# Patient Record
Sex: FEMALE | Race: WHITE
[De-identification: names, ages, dates, MRNs, and addresses within clinical notes are randomized per-mention and may not be internally consistent; named-entity substitution may affect disease eponyms.]

---

## 2017-04-14 ENCOUNTER — HOSPITAL ENCOUNTER (OUTPATIENT)
Dept: HOSPITAL 21 - SAS | Age: 67
Discharge: HOME | End: 2017-04-14
Attending: UROLOGY
Payer: COMMERCIAL

## 2017-04-14 VITALS
HEART RATE: 85 BPM | DIASTOLIC BLOOD PRESSURE: 73 MMHG | RESPIRATION RATE: 12 BRPM | SYSTOLIC BLOOD PRESSURE: 126 MMHG | OXYGEN SATURATION: 100 %

## 2017-04-14 VITALS
RESPIRATION RATE: 15 BRPM | SYSTOLIC BLOOD PRESSURE: 153 MMHG | DIASTOLIC BLOOD PRESSURE: 77 MMHG | HEART RATE: 87 BPM | OXYGEN SATURATION: 100 %

## 2017-04-14 VITALS
OXYGEN SATURATION: 100 % | SYSTOLIC BLOOD PRESSURE: 146 MMHG | RESPIRATION RATE: 11 BRPM | DIASTOLIC BLOOD PRESSURE: 75 MMHG | HEART RATE: 88 BPM

## 2017-04-14 VITALS
RESPIRATION RATE: 18 BRPM | SYSTOLIC BLOOD PRESSURE: 104 MMHG | HEART RATE: 81 BPM | OXYGEN SATURATION: 98 % | DIASTOLIC BLOOD PRESSURE: 52 MMHG

## 2017-04-14 VITALS
DIASTOLIC BLOOD PRESSURE: 85 MMHG | OXYGEN SATURATION: 93 % | HEART RATE: 99 BPM | SYSTOLIC BLOOD PRESSURE: 154 MMHG | RESPIRATION RATE: 18 BRPM

## 2017-04-14 VITALS
DIASTOLIC BLOOD PRESSURE: 90 MMHG | HEART RATE: 88 BPM | SYSTOLIC BLOOD PRESSURE: 162 MMHG | OXYGEN SATURATION: 97 % | RESPIRATION RATE: 18 BRPM

## 2017-04-14 VITALS
OXYGEN SATURATION: 95 % | DIASTOLIC BLOOD PRESSURE: 66 MMHG | RESPIRATION RATE: 16 BRPM | SYSTOLIC BLOOD PRESSURE: 137 MMHG | HEART RATE: 94 BPM

## 2017-04-14 VITALS
SYSTOLIC BLOOD PRESSURE: 136 MMHG | RESPIRATION RATE: 18 BRPM | DIASTOLIC BLOOD PRESSURE: 94 MMHG | OXYGEN SATURATION: 96 % | HEART RATE: 84 BPM

## 2017-04-14 VITALS — BODY MASS INDEX: 29.99 KG/M2 | WEIGHT: 173.5 LBS | HEIGHT: 63.75 IN

## 2017-04-14 VITALS
RESPIRATION RATE: 14 BRPM | HEART RATE: 93 BPM | DIASTOLIC BLOOD PRESSURE: 69 MMHG | SYSTOLIC BLOOD PRESSURE: 120 MMHG | OXYGEN SATURATION: 95 %

## 2017-04-14 VITALS
SYSTOLIC BLOOD PRESSURE: 89 MMHG | OXYGEN SATURATION: 98 % | DIASTOLIC BLOOD PRESSURE: 53 MMHG | RESPIRATION RATE: 12 BRPM | HEART RATE: 83 BPM

## 2017-04-14 DIAGNOSIS — Z87.442: ICD-10-CM

## 2017-04-14 DIAGNOSIS — F17.210: ICD-10-CM

## 2017-04-14 DIAGNOSIS — C67.9: Primary | ICD-10-CM

## 2017-04-14 DIAGNOSIS — J44.9: ICD-10-CM

## 2017-04-14 DIAGNOSIS — Z85.51: ICD-10-CM

## 2017-04-14 DIAGNOSIS — M19.90: ICD-10-CM

## 2017-04-14 DIAGNOSIS — J45.909: ICD-10-CM

## 2017-04-14 DIAGNOSIS — I10: ICD-10-CM

## 2017-04-14 DIAGNOSIS — Z79.82: ICD-10-CM

## 2017-04-14 PROCEDURE — 52234 CYSTOSCOPY AND TREATMENT: CPT

## 2017-04-14 PROCEDURE — 71010: CPT

## 2017-04-14 RX ADMIN — CEFAZOLIN SODIUM ONE: 2 SOLUTION INTRAVENOUS at 08:42

## 2017-04-14 RX ADMIN — CEFAZOLIN SODIUM ONE: 2 SOLUTION INTRAVENOUS at 08:22

## 2017-04-14 NOTE — PCM.ANEP2
Post Anesthesia Evaluation


ASA/CMS Post Anesthesia


VS in Patient's Normal Range?:  Yes


Resp Stable; Airway Patent?:  Yes


CV Function & Hydration Stable:  Yes


Mental Status Recovered?:  Yes


Pain control Satisfactory?:  Yes


N/V Control Satisfactory?:  Yes








Deidre Antunez DO Apr 14, 2017 09:28

## 2017-04-14 NOTE — OP
[address] [address]

 

                                OPERATIVE REPORT

 

PATIENT:  LORRAINE CHA                                    : 1950

ACCOUNT#: [id number]                                            MR#: [id number]

ADMIT:    2017

JOB ID:   [id number]

 

 

DATE OF SURGERY:

2017

 

SURGEON:

Kath Hill MD.

 

ASSISTANT:

None.

 

PREOPERATIVE DIAGNOSIS(ES):

Bladder tumor.

 

POSTOPERATIVE DIAGNOSIS(ES):

Bladder tumor.

 

PROCEDURE PERFORMED:

1.     Pelvic exam under anesthesia.

2.     Cystoscopy and transurethral resection of bladder tumor

(approximately 1.5 cm).

 

FINDINGS:

1.     Papillary tumor posterior and lateral to the ureteral orifice at the 
right

base.

2.     No abnormal masses on pelvic exam.

 

ANESTHESIA:

General.

 

ESTIMATED BLOOD LOSS:

Less than 1 mL.

 

DRAINS:

None.

 

SPECIMEN:

Bladder tumor.

 

COMPLICATIONS:

None.

 

CONDITION:

Stable.

 

INDICATION FOR PROCEDURE:

The patient is a 66-year-old woman with a history of bladder cancer.  She was

found to have recurrent bladder tumor.  She now presents for transurethral

resection of bladder tumor.

 

DESCRIPTION OF PROCEDURE:

After informed consent was obtained, the patient was taken to the operating

room.  A time-out was performed identifying correct patient, surgical site and

procedure.   General anesthesia was smoothly induced.  She was placed in the

lithotomy position and all pressure points were identified and appropriately

padded.  A bimanual pelvic exam was performed; it was nromal.  The bladder was 
fully

mobile.  Next, her genitals were then prepped and draped in usual sterile

fashion.  A 26-Tristanian resectoscope was then applied to the patient's urethra

under direct vision and advanced into the bladder.  The bladder was drained.

Both 30-and 70-degree lenses were used for panendoscopy.  There was just the

one bladder tumor as mentioned.  A 24-Tristanian loop was then applied to the tumor

and it was resected in piecemeal fashion.  There was good resection of the base

with gross muscle visible.  There was no fat ever visible during the procedure.

The specimens were drained from the patient's bladder in their entirety.  The

base of the tumor, as well as a 1 cm surrounding margin, were fulgurated with

the loop.  The bladder was reinspected.  There was excellent hemostasis.  The

tumor was passed off the table as bladder tumor to Pathology.  The instruments

were then removed from patient's body and she was reversed from general

anesthesia and taken to the PACU in good and stable condition.

 

 

SHEILA

## 2017-04-14 NOTE — PCM.HPANE
Patient Data


Surgeon


Admitting Provider:


Attending Provider:Kath Hill MD 


Primary Care Physician:Radha Rhoades PA-C 


Other Provider:[facility]


Reason for Visit


Bladder Tumor


Ht/WT & BMI


Height (Feet):  5


Height (Inches):  3.75


Weight (Kilograms):  79.2


Body Mass Index


30.00





Allergies


Coded Allergies:  


     No Known Allergies (Unverified , 4/12/17)





Past Anesthesia History


Anesthesia History:  Denies:: Anesthesia Reactions, Malignant Hyperthermia





Diabetes History


Hx Diabetes?:  No





MRSA


MRSA:  No





Medications


Blood Thinner:  Aspirin


Hypertension Medication:  Yes (AMLODIPINE,COZAAR)


Reported Medications


Simvastatin 20 Mg Ieakdj63 Mg PO HS  Ref 0


   4/12/17


Omeprazole 20 Mg Tablet.dr20 Mg PO DAILY


   4/12/17


Nortriptyline 25 Mg Chabyap10 Mg PO HS


   4/12/17


Levothyroxine 75 Mcg Fpackp32 Mcg PO DAILY  Ref 0


   4/12/17


Ipratropium Bromide (Ipratropium Bromide Inhalant Solution)0.2 Mg/1 Ml 

Solution0.2 Mg IH QID  Ref 0


   4/12/17


Cyclobenzaprine 10 Mg Vtathz71 Mg PO HS PRN Spasm  Ref 0


   4/12/17


Losartan Potassium (Cozaar)50 Mg Lmjgsd70 Mg PO DAILY


   4/12/17


Albuterol HFA (Proair HFA)8.5 Gm Hfa.aer.ad2 Puffs INHALATION Q4H PRN PRN #1 

INHALER


   4/12/17


Discontinued Reported Medications


Aspirin 81 Mg Kgwbkj23 Mg PO DAILY  Ref 0


   4/12/17


Amlodipine 2.5 Mg Tablet2.5 Mg PO DAILY  Ref 0


   4/12/17





History


History of ENT Problems?:  No


Hx of Heart Problems?:  Yes








Cardiovascular History:   Positive for:: Hypertension (HYPERLIPIDEMIA)





  





 Denies:: Heart Murmur








Hx of Respiratory Problem?:  Yes








Respiratory History:   Positive for:: Asthma





  COPD





  Dyspnea (INTERMITTANT WHEEZING)





  Use of Inhalers / NEBS





  





 Denies:: Use of C-PAP Machine








Hx Neurologic Problems?:  Yes


Hx of GI Problems?:  Yes








Gastrointestinal History:   Positive for:: Gall Bladder Disease (S/P BHASKAR)





  Rectal Bleeding (S/P RECTAL PROLAPSE RPR)








Hx of  Problems?:  Yes








Genitourinary History:   Positive for:: Kidney Stones (HX CYSTINE URINARY TRACT 

STONES)














Other  Pertinent History:   S/P URETEROSCOPY,TURBT X2


 





 


 





 


 





 


 





 HX BLADDER CA 2006,2009


 





 


 





 


 





 


 





 C/OF NOCTURIA,HEMATURIA (RESOLVED)


 





 


 





 


 





 


 





 BLADDER TUMOR=CURRENT PROBLEM














Female Hx:   Denies:: Currently Pregnant














Skin History:   Denies:: History Skin Disorders?





  Pressure Ulcers








Hx Musculoskeletal Problems?:  Yes








Musculoskeletal History:   Positive for:: Musculoskeletal Trauma (S/P THUMB RPR)








Hx of Psycho/Social Problems?:  No


Hx Surgeries?:  Yes (TURBT X2,URETEROSCOPY,THUMB RPR,HYST,BHASKAR,RECTAL PROLAPSE 

RPR)








Other History:   Positive for:: Cancer (BLADDER X2)





  Thyroid Disease





  





 Denies:: Hospitalization








Hx Diabetes:  No


Hx Alcohol Use:  NoHx Substance Use:  NoHave You Smoked inLast 12 mo:  Yes

Approx How Many Cigarettes/day:  6-7 C/DAY





Stop/Bang


S-Snoring: Do You Snore Loudly:  No


T-Tired: feel tired, fatigued:  Yes


O-Obsered: Observed not breath:  No


P-Blood Pressure: treated:  Yes


B- Body Mass Index > 35 kg/m2:  No


A- Age over 50:  Yes


N- Neck Large Circumference:  No


G- Gender Male:  No


OLY Total Score:  3


Risk Assessment Category


Category 1A:


   Patient has history of documented sleep apnea, and HAS NOT received any 

narcotic, sedative or anesthesia administration during this stay.


Category 1B:


   Patient has history of documented sleep apnea, and HAS received any narcotic

, sedative or anesthesia administration during this stay


Category 2:


   Patient has SUSPECTED Obstructive Sleep Apnea, and HAS received any narcotic

, sedative or anesthesia administration during this stay.


Category 3:


   Patient has SUSPECTED Obstructive Sleep Apnea and HAS NOT received narcotic, 

sedative or anesthesia administration during this stay.


Category 4:


   Outpatient in Procedural Areas with known sleep apnea or who screen positive 

for High Risk via the STOP/BANG questionnaire.





Exam


Exam


General Appearance:  Alert, Oriented X3, Cooperative, No Acute Distress


HEENT/AIRWAY:  MP 2


Lungs:  Coarse


Heart:  Regular Rate/Rhythm





Meds/Labs/Diagnostics


Admission Meds





 Current Medications


Lactated Ringer's (Lr) 1,000 ml @  120 mls/hr Q8H20M IV  Last administered on 4/ 14/17at 06:50;  Start 4/14/17 at 05:00;  Stop 4/14/17 at 13:19





Plan


Impression


Patient chart reviewed, patient interviewed and anesthestic plan with risks, 

benefits, and alternatives discussed, and informed consent obtained.


ASA Physical Status:  ASA3 Severe Disease


Anesthetic Plan:  GA


Bene/Risks/Altern/Consents:  Yes


HP Complete Prior to Induction:  Yes








Deidre Antunez DO Apr 14, 2017 07:29

## 2017-04-14 NOTE — DRSVH
PROCEDURE:  X-RAY CHEST ONE VIEW, PORTABLE ([id number])

 

INDICATIONS:  BRONCHOSPASM

 

TECHNIQUE:  One view of the chest was acquired.  

 

COMPARISON:  None.

 

FINDINGS:  

 

Surgical changes and devices: Cervical fixation hardware is grossly intact.

 

Lungs and pleura: Diffuse interstitial markings are present bilaterally, more confluent within the pe
ripheral right lung. No pleural effusion or pneumothorax.

 

Mediastinum:  Mediastinal contours appear normal.  Heart size is normal.  

 

Bones and chest wall:  No suspicious bony lesions.  Overlying soft tissues appear unremarkable.  

 

IMPRESSION:

 

1. Diffuse interstitial markings most confluent in the right peripheral lung. It is unclear whether t
hese represent underlying pulmonary fibrosis or pulmonary edema. No prior comparisons are available t
o determine the acuity of this finding.

 

 

 

Dictated by:  Josseline Aranda M.D. on 4/14/2017 at 10:33     

Approved by:  Josseline Aranda M.D. on 4/14/2017 at 10:36

## 2017-06-21 ENCOUNTER — HOSPITAL ENCOUNTER (OUTPATIENT)
Dept: HOSPITAL 76 - LAB.WCP | Age: 67
Discharge: HOME | End: 2017-06-21
Attending: FAMILY MEDICINE
Payer: COMMERCIAL

## 2017-06-21 DIAGNOSIS — E78.5: Primary | ICD-10-CM

## 2017-06-21 DIAGNOSIS — I10: ICD-10-CM

## 2017-06-21 LAB
ALBUMIN/GLOB SERPL: 1.4 {RATIO} (ref 1–2.2)
ANION GAP SERPL CALCULATED.4IONS-SCNC: 6 MMOL/L (ref 6–13)
BASOPHILS NFR BLD AUTO: 0.1 10^3/UL (ref 0–0.1)
BASOPHILS NFR BLD AUTO: 0.9 %
BILIRUB BLD-MCNC: 0.3 MG/DL (ref 0.2–1)
BUN SERPL-MCNC: 14 MG/DL (ref 6–20)
CALCIUM UR-MCNC: 9.2 MG/DL (ref 8.5–10.3)
CHLORIDE SERPL-SCNC: 106 MMOL/L (ref 101–111)
CHOLEST SERPL-MCNC: 160 MG/DL
CO2 SERPL-SCNC: 28 MMOL/L (ref 21–32)
CREAT SERPLBLD-SCNC: 0.6 MG/DL (ref 0.4–1)
EOSINOPHIL # BLD AUTO: 0.4 10^3/UL (ref 0–0.7)
EOSINOPHIL NFR BLD AUTO: 5.9 %
ERYTHROCYTE [DISTWIDTH] IN BLOOD BY AUTOMATED COUNT: 13.2 % (ref 12–15)
EST. AVERAGE GLUCOSE BLD GHB EST-MCNC: 131 MG/DL (ref 70–100)
GFRSERPLBLD MDRD-ARVRAT: 101 ML/MIN/{1.73_M2} (ref 89–?)
GLOBULIN SER-MCNC: 2.9 G/DL (ref 2.1–4.2)
GLUCOSE SERPL-MCNC: 108 MG/DL (ref 70–100)
HBA1C BLD-MCNC: 0.66 G/DL
HCT VFR BLD AUTO: 40.4 % (ref 37–47)
HDLC SERPL-MCNC: 37 MG/DL
HDLC SERPL: 4.3 {RATIO} (ref ?–4.4)
HGB UR QL STRIP: 13.5 G/DL (ref 12–16)
LDLC/HDLC SERPL: 1.9 {RATIO} (ref ?–4.4)
LYMPHOCYTES # SPEC AUTO: 2.1 10^3/UL (ref 1.5–3.5)
LYMPHOCYTES NFR BLD AUTO: 29.5 %
MCH RBC QN AUTO: 31.9 PG (ref 27–31)
MCHC RBC AUTO-ENTMCNC: 33.4 G/DL (ref 32–36)
MCV RBC AUTO: 95.6 FL (ref 81–99)
MONOCYTES # BLD AUTO: 0.5 10^3/UL (ref 0–1)
MONOCYTES NFR BLD AUTO: 6.5 %
NEUTROPHILS # BLD AUTO: 4 10^3/UL (ref 1.5–6.6)
NEUTROPHILS # SNV AUTO: 7.1 X10^3/UL (ref 4.8–10.8)
NEUTROPHILS NFR BLD AUTO: 57.2 %
NRBC # BLD AUTO: 0.1 /100WBC
PDW BLD AUTO: 7.9 FL (ref 7.9–10.8)
POTASSIUM SERPL-SCNC: 3.9 MMOL/L (ref 3.5–5)
PROT SPEC-MCNC: 6.9 G/DL (ref 6.7–8.2)
RBC MAR: 4.23 10^6/UL (ref 4.2–5.4)
SODIUM SERPLBLD-SCNC: 140 MMOL/L (ref 135–145)
TRIGL P FAST SERPL-MCNC: 254 MG/DL
VLDLC SERPL-SCNC: 51 MG/DL
WBC # BLD: 7.1 X10^3/UL

## 2017-06-21 PROCEDURE — 85025 COMPLETE CBC W/AUTO DIFF WBC: CPT

## 2017-06-21 PROCEDURE — 80061 LIPID PANEL: CPT

## 2017-06-21 PROCEDURE — 36415 COLL VENOUS BLD VENIPUNCTURE: CPT

## 2017-06-21 PROCEDURE — 84443 ASSAY THYROID STIM HORMONE: CPT

## 2017-06-21 PROCEDURE — 83036 HEMOGLOBIN GLYCOSYLATED A1C: CPT

## 2017-06-21 PROCEDURE — 80053 COMPREHEN METABOLIC PANEL: CPT

## 2018-03-16 ENCOUNTER — HOSPITAL ENCOUNTER (INPATIENT)
Dept: HOSPITAL 76 - ED | Age: 68
LOS: 3 days | Discharge: HOME | DRG: 192 | End: 2018-03-19
Attending: FAMILY MEDICINE | Admitting: INTERNAL MEDICINE
Payer: MEDICARE

## 2018-03-16 ENCOUNTER — HOSPITAL ENCOUNTER (OUTPATIENT)
Dept: HOSPITAL 76 - EMS | Age: 68
Discharge: ANOTHER HOSPITAL | End: 2018-03-16
Attending: SURGERY
Payer: COMMERCIAL

## 2018-03-16 DIAGNOSIS — R06.02: Primary | ICD-10-CM

## 2018-03-16 DIAGNOSIS — Z79.82: ICD-10-CM

## 2018-03-16 DIAGNOSIS — J44.9: Primary | ICD-10-CM

## 2018-03-16 DIAGNOSIS — I10: ICD-10-CM

## 2018-03-16 DIAGNOSIS — F17.200: ICD-10-CM

## 2018-03-16 DIAGNOSIS — J84.10: ICD-10-CM

## 2018-03-16 DIAGNOSIS — J44.1: ICD-10-CM

## 2018-03-16 LAB
ALBUMIN DIAFP-MCNC: 4.2 G/DL (ref 3.2–5.5)
ALBUMIN/GLOB SERPL: 1.4 {RATIO} (ref 1–2.2)
ALP SERPL-CCNC: 56 IU/L (ref 42–121)
ALT SERPL W P-5'-P-CCNC: 18 IU/L (ref 10–60)
ANION GAP SERPL CALCULATED.4IONS-SCNC: 9 MMOL/L (ref 6–13)
AST SERPL W P-5'-P-CCNC: 20 IU/L (ref 10–42)
BASOPHILS NFR BLD AUTO: 0.1 10^3/UL (ref 0–0.1)
BASOPHILS NFR BLD AUTO: 0.4 %
BILIRUB BLD-MCNC: 0.4 MG/DL (ref 0.2–1)
BUN SERPL-MCNC: 16 MG/DL (ref 6–20)
CALCIUM UR-MCNC: 8.7 MG/DL (ref 8.5–10.3)
CHLORIDE SERPL-SCNC: 101 MMOL/L (ref 101–111)
CO2 SERPL-SCNC: 25 MMOL/L (ref 21–32)
CREAT SERPLBLD-SCNC: 0.7 MG/DL (ref 0.4–1)
EOSINOPHIL # BLD AUTO: 0 10^3/UL (ref 0–0.7)
EOSINOPHIL NFR BLD AUTO: 0.2 %
ERYTHROCYTE [DISTWIDTH] IN BLOOD BY AUTOMATED COUNT: 13.2 % (ref 12–15)
GFRSERPLBLD MDRD-ARVRAT: 84 ML/MIN/{1.73_M2} (ref 89–?)
GLOBULIN SER-MCNC: 3 G/DL (ref 2.1–4.2)
GLUCOSE SERPL-MCNC: 251 MG/DL (ref 70–100)
HGB UR QL STRIP: 13.1 G/DL (ref 12–16)
LIPASE SERPL-CCNC: 31 U/L (ref 22–51)
LYMPHOCYTES # SPEC AUTO: 4.2 10^3/UL (ref 1.5–3.5)
LYMPHOCYTES NFR BLD AUTO: 21 %
MCH RBC QN AUTO: 29.9 PG (ref 27–31)
MCHC RBC AUTO-ENTMCNC: 32.5 G/DL (ref 32–36)
MCV RBC AUTO: 91.9 FL (ref 81–99)
MONOCYTES # BLD AUTO: 1.3 10^3/UL (ref 0–1)
MONOCYTES NFR BLD AUTO: 6.5 %
NEUTROPHILS # BLD AUTO: 14.4 10^3/UL (ref 1.5–6.6)
NEUTROPHILS # SNV AUTO: 20 X10^3/UL (ref 4.8–10.8)
NEUTROPHILS NFR BLD AUTO: 71.9 %
PDW BLD AUTO: 7.6 FL (ref 7.9–10.8)
PLATELET # BLD: 331 10^3/UL (ref 130–450)
PROT SPEC-MCNC: 7.2 G/DL (ref 6.7–8.2)
RBC MAR: 4.38 10^6/UL (ref 4.2–5.4)
SODIUM SERPLBLD-SCNC: 135 MMOL/L (ref 135–145)

## 2018-03-16 PROCEDURE — 94640 AIRWAY INHALATION TREATMENT: CPT

## 2018-03-16 PROCEDURE — 96365 THER/PROPH/DIAG IV INF INIT: CPT

## 2018-03-16 PROCEDURE — 85610 PROTHROMBIN TIME: CPT

## 2018-03-16 PROCEDURE — 87276 INFLUENZA A AG IF: CPT

## 2018-03-16 PROCEDURE — 82040 ASSAY OF SERUM ALBUMIN: CPT

## 2018-03-16 PROCEDURE — 87275 INFLUENZA B AG IF: CPT

## 2018-03-16 PROCEDURE — 93005 ELECTROCARDIOGRAM TRACING: CPT

## 2018-03-16 PROCEDURE — 36415 COLL VENOUS BLD VENIPUNCTURE: CPT

## 2018-03-16 PROCEDURE — 87150 DNA/RNA AMPLIFIED PROBE: CPT

## 2018-03-16 PROCEDURE — 87040 BLOOD CULTURE FOR BACTERIA: CPT

## 2018-03-16 PROCEDURE — 94660 CPAP INITIATION&MGMT: CPT

## 2018-03-16 PROCEDURE — 71045 X-RAY EXAM CHEST 1 VIEW: CPT

## 2018-03-16 PROCEDURE — 99284 EMERGENCY DEPT VISIT MOD MDM: CPT

## 2018-03-16 PROCEDURE — 80053 COMPREHEN METABOLIC PANEL: CPT

## 2018-03-16 PROCEDURE — 80048 BASIC METABOLIC PNL TOTAL CA: CPT

## 2018-03-16 PROCEDURE — 99285 EMERGENCY DEPT VISIT HI MDM: CPT

## 2018-03-16 PROCEDURE — 83880 ASSAY OF NATRIURETIC PEPTIDE: CPT

## 2018-03-16 PROCEDURE — 93306 TTE W/DOPPLER COMPLETE: CPT

## 2018-03-16 PROCEDURE — 84484 ASSAY OF TROPONIN QUANT: CPT

## 2018-03-16 PROCEDURE — 83690 ASSAY OF LIPASE: CPT

## 2018-03-16 PROCEDURE — 83735 ASSAY OF MAGNESIUM: CPT

## 2018-03-16 PROCEDURE — 85025 COMPLETE CBC W/AUTO DIFF WBC: CPT

## 2018-03-16 PROCEDURE — 84100 ASSAY OF PHOSPHORUS: CPT

## 2018-03-16 NOTE — ED PHYSICIAN DOCUMENTATION
PD HPI DYSPNEA





- Stated complaint


Stated Complaint: SOA





- Chief complaint


Chief Complaint: Resp





- History obtained from


History obtained from: Patient, EMS





- History of Present Illness


Timing - onset: Today


Timing - onset during: Rest


Timing - details: Gradual onset, Still present


Inciting event(s): Exposure (ie smoke)


Improved by: O2, Epi pen


Associated symptoms: Cough, Wheezing.  No: Fever


Similar symptoms before: Work up / diagnostics


Recently seen: Not recently seen





- Additional information


Additional information: 





Patient is a 67 year old female with a history of copd who is presenting to the 

emergency department for shortness of breath.  according to patient and ems 

patient had worsening shortness of breath today, and called ems.  When ems 

arrived patient was very short of breath with accessory muscle use.  Patient 

was treated with a duoneb, solumedrol and epinephrine twice before arriving in 

the emergency department.  Upon arrival patient was still short of breath.  





Review of Systems


Constitutional: denies: Fever, Chills


Eyes: reports: Reviewed and negative


Ears: reports: Reviewed and negative


Nose: reports: Reviewed and negative


Throat: reports: Reviewed and negative


Respiratory: reports: Dyspnea, Cough, Wheezing


GI: denies: Nausea, Vomiting


: reports: Reviewed and negative


Skin: reports: Reviewed and negative


Musculoskeletal: reports: Reviewed and negative


Neurologic: denies: Generalized weakness, Focal weakness, Headache, Head injury


Immunocompromised: denies: Immunocompromised





PD PAST MEDICAL HISTORY





- Past Medical History


Past Medical History: Yes


Respiratory: COPD


Musculoskeletal: Chronic back pain





- Past Surgical History


Past Surgical History: Yes


General: Cholecystectomy


Ortho: Spine surgery


/GYN: Hysterectomy





- Present Medications


Home Medications: 


 Ambulatory Orders











 Medication  Instructions  Recorded  Confirmed


 


ALPRAZolam [Xanax] 0.5 mg PO BID 07/10/13 07/10/13


 


Albuterol Sulfate 2.5 mg IH DAILY 07/10/13 03/17/18


 


Albuterol [Ventolin Hfa] 2 puffs INH Q4H PRN 07/10/13 03/17/18


 


Amlodipine Besylate 2.5 mg PO DAILY 07/10/13 03/17/18


 


Aspirin [Aspir 81] 81 mg PO DAILY 07/10/13 03/17/18


 


Cyclobenzaprine HCl 10 mg PO PRN PRN 07/10/13 03/17/18


 


Ipratropium [Atrovent] 0.2 mg INH DAILY 07/10/13 03/17/18


 


Levothyroxine [Synthroid] 75 mcg PO DAILY 07/10/13 03/17/18


 


Losartan [Cozaar] 50 mg PO DAILY 07/10/13 03/17/18


 


Nortriptyline HCl 25 mg PO DAILY PM 07/10/13 03/17/18


 


Simvastatin [Zocor] 20 mg PO DAILY PM 07/10/13 03/17/18


 


Omeprazole 20 mg PO DAILY 03/17/18 03/17/18


 


busPIRone [Buspar] 15 mg PO BID 03/17/18 03/17/18


 


guaiFENesin [Mucinex] 600 mg PO BID 03/17/18 03/17/18


 


predniSONE [Prednisone]  03/17/18 














- Allergies


Allergies/Adverse Reactions: 


 Allergies











Allergy/AdvReac Type Severity Reaction Status Date / Time


 


No Known Drug Allergies Allergy   Verified 03/16/18 22:20














- Social History


Does the pt smoke?: Yes


Smoking Status: Current every day smoker


Does the pt drink ETOH?: No


Does the pt have substance abuse?: No





- Immunizations


Immunizations are current?: Yes





- POLST


Patient has POLST: No





PD ED PE NORMAL





- Vitals


Vital signs reviewed: Yes





- General


General: Alert and oriented X 3





- HEENT


HEENT: Atraumatic, PERRL





- Neck


Neck: Supple, no meningeal sign, No JVD





- Cardiac


Cardiac: RRR





- Derm


Derm: Normal color, No rash





- Extremities


Extremities: No calf tenderness / cord





- Neuro


Neuro: Alert and oriented X 3, No motor deficit, No sensory deficit, Normal 

speech


Eye Opening: Spontaneous


Motor: Obeys Commands


Verbal: Oriented


GCS Score: 15





- Psych


Psych: Normal mood





PD ED PE EXPANDED





- General


General: Alert, Anxious, In distress





- Cardiac


Cardiac: Tachy





- Respiratory


Respiratory: Distress, Labored, Accessory mm use, Retractions, Wheezing, Right 

upper lobe, Left upper lobe





Results





- Vitals


Vitals: 


 Vital Signs - 24 hr











  03/16/18 03/16/18 03/16/18





  21:53 22:15 22:25


 


Temperature 36.6 C  


 


Heart Rate 130 H 116 H 117 H


 


Respiratory 24  20





Rate   


 


Blood Pressure 165/71 H  


 


O2 Saturation 97  














  03/16/18 03/16/18 03/16/18





  22:28 23:00 23:07


 


Temperature   


 


Heart Rate 116 H 112 H 112 H


 


Respiratory 22 20 21





Rate   


 


Blood Pressure 154/71 H 128/66 


 


O2 Saturation 95 99 99














  03/16/18 03/16/18





  23:16 23:34


 


Temperature  


 


Heart Rate 115 H 115 H


 


Respiratory 21 22





Rate  


 


Blood Pressure  123/68


 


O2 Saturation 99 96








 Oxygen











O2 Source                      BIPAP

















- EKG (time done)


  ** 2239


Rate: Rate (enter#) (114)


Rhythm: Sinus tachycardia


Axis: LAD


QRS: LVH


Other comments: Other comments (rate related ischemia)


Compare to prior EKG: Old EKG unavailable





- Labs


Labs: 


 Laboratory Tests











  03/16/18 03/16/18 03/16/18





  22:50 22:50 22:50


 


WBC  20.0 H  


 


RBC  4.38  


 


Hgb  13.1  


 


Hct  40.2  


 


MCV  91.9  


 


MCH  29.9  


 


MCHC  32.5  


 


RDW  13.2  


 


Plt Count  331  


 


MPV  7.6 L  


 


Neut #  14.4 H  


 


Lymph #  4.2 H  


 


Mono #  1.3 H  


 


Eos #  0.0  


 


Baso #  0.1  


 


Absolute Nucleated RBC  0.00  


 


Nucleated RBC %  0.0  


 


Sodium   135 


 


Potassium   2.9 L 


 


Chloride   101 


 


Carbon Dioxide   25 


 


Anion Gap   9.0 


 


BUN   16 


 


Creatinine   0.7 


 


Estimated GFR (MDRD)   84 L 


 


Glucose   251 H 


 


Calcium   8.7 


 


Total Bilirubin   0.4 


 


AST   20 


 


ALT   18 


 


Alkaline Phosphatase   56 


 


Troponin I    < 0.04


 


B-Natriuretic Peptide   


 


Total Protein   7.2 


 


Albumin   4.2 


 


Globulin   3.0 


 


Albumin/Globulin Ratio   1.4 


 


Lipase   31 














  03/16/18





  22:50


 


WBC 


 


RBC 


 


Hgb 


 


Hct 


 


MCV 


 


MCH 


 


MCHC 


 


RDW 


 


Plt Count 


 


MPV 


 


Neut # 


 


Lymph # 


 


Mono # 


 


Eos # 


 


Baso # 


 


Absolute Nucleated RBC 


 


Nucleated RBC % 


 


Sodium 


 


Potassium 


 


Chloride 


 


Carbon Dioxide 


 


Anion Gap 


 


BUN 


 


Creatinine 


 


Estimated GFR (MDRD) 


 


Glucose 


 


Calcium 


 


Total Bilirubin 


 


AST 


 


ALT 


 


Alkaline Phosphatase 


 


Troponin I 


 


B-Natriuretic Peptide  56


 


Total Protein 


 


Albumin 


 


Globulin 


 


Albumin/Globulin Ratio 


 


Lipase 














- Rads (name of study)


  ** chest x-ray


Radiology: Final report received (chronic findings)





PD MEDICAL DECISION MAKING





- ED course


Complexity details: reviewed old records, reviewed results, re-evaluated patient

, considered differential, d/w patient, d/w consultant


ED course: 





Patient was seen immediately at bedside.  respiratory therapy was contacted and 

bipap was ordered.  Patient was treated with additional duoneb and then started 

on continuous albuterol.  IV access was gained and labs were drawn.  chest x-

ray was ordered.  Patient was started on magnesium.  Patient responded well to 

the bipap.  chest x-ray revealed chronic findings but no acute infiltrate.  

Patient's potassium was replaced.  Hospitalist was contacted and the case was 

discussed with her.  She stated she would order antibiotics and would admit the 

patient to the icu.  Patient was feeling much better on transfer.  





Departure





- Departure


Disposition: [facility] DC/Dai


Clinical Impression: 


 Severe chronic obstructive pulmonary disease





Condition: Critical


Discharge Date/Time: 03/17/18 00:10

## 2018-03-16 NOTE — XRAY PRELIMINARY REPORT
Accession: [id number]

Exam: XR CHEST 1 VIEW X-RAY

 

IMPRESSION: Chronic coarse interstitial opacities, likely mostly related to patient's given history o
f COPD. Difficult to entirely exclude very mild superimposed interstitial pulmonary edema.

 

[facility]

 

SITE ID: [id number]

## 2018-03-16 NOTE — XRAY REPORT
EXAM: 

CHEST RADIOGRAPHY

 

EXAM DATE: 3/16/2018 10:51 PM.

 

CLINICAL HISTORY: Hypoxia, chronic obstructive pulmonary disease.

 

COMPARISON: 11/21/2011, 11/07/2011 CT, 06/30/2011.

 

TECHNIQUE: 1 view.

 

FINDINGS:

Lungs/Pleura: Chronic coarse interstitial opacities without definite focal pneumonia or superimposed 
edema. No gross pneumothorax or large effusion.

 

Mediastinum: Within exam limitations, the cardiomediastinal contour is normal.

 

Other: Previous cervical surgery.

 

IMPRESSION: Chronic coarse interstitial opacities, likely mostly related to patient's given history o
f COPD. Difficult to entirely exclude very mild superimposed interstitial pulmonary edema.

 

[facility]

Referring Provider Line: [phone]

 

SITE ID: [id number]

## 2018-03-17 LAB
ANION GAP SERPL CALCULATED.4IONS-SCNC: 11 MMOL/L (ref 6–13)
BASOPHILS NFR BLD AUTO: 0 10^3/UL (ref 0–0.1)
BASOPHILS NFR BLD AUTO: 0.4 %
BUN SERPL-MCNC: 15 MG/DL (ref 6–20)
CALCIUM UR-MCNC: 8.1 MG/DL (ref 8.5–10.3)
CHLORIDE SERPL-SCNC: 103 MMOL/L (ref 101–111)
CO2 SERPL-SCNC: 21 MMOL/L (ref 21–32)
CREAT SERPLBLD-SCNC: 0.6 MG/DL (ref 0.4–1)
EOSINOPHIL # BLD AUTO: 0 10^3/UL (ref 0–0.7)
EOSINOPHIL NFR BLD AUTO: 0 %
ERYTHROCYTE [DISTWIDTH] IN BLOOD BY AUTOMATED COUNT: 13.3 % (ref 12–15)
GFRSERPLBLD MDRD-ARVRAT: 100 ML/MIN/{1.73_M2} (ref 89–?)
GLUCOSE SERPL-MCNC: 233 MG/DL (ref 70–100)
HGB UR QL STRIP: 12.3 G/DL (ref 12–16)
INFLUENZA A & B AG INTERPRET: (no result)
INR PPP: 1 (ref 0.8–1.2)
LYMPHOCYTES # SPEC AUTO: 0.8 10^3/UL (ref 1.5–3.5)
LYMPHOCYTES NFR BLD AUTO: 6.6 %
MAGNESIUM SERPL-MCNC: 2.1 MG/DL (ref 1.7–2.8)
MCH RBC QN AUTO: 30.2 PG (ref 27–31)
MCHC RBC AUTO-ENTMCNC: 32.3 G/DL (ref 32–36)
MCV RBC AUTO: 93.4 FL (ref 81–99)
MONOCYTES # BLD AUTO: 0.1 10^3/UL (ref 0–1)
MONOCYTES NFR BLD AUTO: 1.1 %
NEUTROPHILS # BLD AUTO: 11 10^3/UL (ref 1.5–6.6)
NEUTROPHILS # SNV AUTO: 12 X10^3/UL (ref 4.8–10.8)
NEUTROPHILS NFR BLD AUTO: 91.9 %
PDW BLD AUTO: 7.7 FL (ref 7.9–10.8)
PHOSPHATE BLD-MCNC: 3.1 MG/DL (ref 2.5–4.6)
PLATELET # BLD: 274 10^3/UL (ref 130–450)
PROTHROM ACT/NOR PPP: 11.4 SECS (ref 9.9–12.6)
RBC MAR: 4.07 10^6/UL (ref 4.2–5.4)
SODIUM SERPLBLD-SCNC: 135 MMOL/L (ref 135–145)

## 2018-03-17 RX ADMIN — METHYLPREDNISOLONE SODIUM SUCCINATE SCH MG: 40 INJECTION, POWDER, FOR SOLUTION INTRAMUSCULAR; INTRAVENOUS at 12:06

## 2018-03-17 RX ADMIN — SODIUM CHLORIDE SCH MLS/HR: 9 INJECTION, SOLUTION INTRAVENOUS at 14:30

## 2018-03-17 RX ADMIN — SODIUM CHLORIDE, PRESERVATIVE FREE SCH ML: 5 INJECTION INTRAVENOUS at 01:28

## 2018-03-17 RX ADMIN — FAMOTIDINE SCH MG: 20 TABLET, FILM COATED ORAL at 08:52

## 2018-03-17 RX ADMIN — NORTRIPTYLINE HYDROCHLORIDE SCH MG: 25 CAPSULE ORAL at 20:35

## 2018-03-17 RX ADMIN — SODIUM CHLORIDE SCH MLS/HR: 9 INJECTION, SOLUTION INTRAVENOUS at 21:23

## 2018-03-17 RX ADMIN — SODIUM CHLORIDE, PRESERVATIVE FREE SCH ML: 5 INJECTION INTRAVENOUS at 08:52

## 2018-03-17 RX ADMIN — ASPIRIN SCH MG: 81 TABLET, COATED ORAL at 08:50

## 2018-03-17 RX ADMIN — DEXTROSE AND SODIUM CHLORIDE SCH MLS/HR: 5; 450 INJECTION, SOLUTION INTRAVENOUS at 21:21

## 2018-03-17 RX ADMIN — SODIUM CHLORIDE, PRESERVATIVE FREE PRN ML: 5 INJECTION INTRAVENOUS at 06:54

## 2018-03-17 RX ADMIN — CYCLOBENZAPRINE SCH MG: 10 TABLET, FILM COATED ORAL at 08:49

## 2018-03-17 RX ADMIN — IPRATROPIUM BROMIDE AND ALBUTEROL SULFATE PRN ML: 2.5; .5 SOLUTION RESPIRATORY (INHALATION) at 09:15

## 2018-03-17 RX ADMIN — METHYLPREDNISOLONE SODIUM SUCCINATE SCH MG: 40 INJECTION, POWDER, FOR SOLUTION INTRAMUSCULAR; INTRAVENOUS at 18:07

## 2018-03-17 RX ADMIN — LOSARTAN POTASSIUM SCH MG: 50 TABLET, FILM COATED ORAL at 08:52

## 2018-03-17 RX ADMIN — IPRATROPIUM BROMIDE AND ALBUTEROL SULFATE PRN ML: 2.5; .5 SOLUTION RESPIRATORY (INHALATION) at 12:15

## 2018-03-17 RX ADMIN — DEXTROSE MONOHYDRATE SCH MLS/HR: 50 INJECTION, SOLUTION INTRAVENOUS at 08:45

## 2018-03-17 RX ADMIN — METHYLPREDNISOLONE SODIUM SUCCINATE SCH MG: 40 INJECTION, POWDER, FOR SOLUTION INTRAMUSCULAR; INTRAVENOUS at 06:54

## 2018-03-17 RX ADMIN — METHYLPREDNISOLONE SODIUM SUCCINATE SCH MG: 40 INJECTION, POWDER, FOR SOLUTION INTRAMUSCULAR; INTRAVENOUS at 01:04

## 2018-03-17 RX ADMIN — SODIUM CHLORIDE, PRESERVATIVE FREE SCH ML: 5 INJECTION INTRAVENOUS at 17:02

## 2018-03-17 RX ADMIN — DEXTROSE MONOHYDRATE SCH MLS/HR: 50 INJECTION, SOLUTION INTRAVENOUS at 01:03

## 2018-03-17 RX ADMIN — SODIUM CHLORIDE, PRESERVATIVE FREE PRN ML: 5 INJECTION INTRAVENOUS at 01:04

## 2018-03-17 RX ADMIN — SODIUM CHLORIDE SCH MLS/HR: 9 INJECTION, SOLUTION INTRAVENOUS at 01:42

## 2018-03-17 RX ADMIN — DEXTROSE AND SODIUM CHLORIDE SCH MLS/HR: 5; 450 INJECTION, SOLUTION INTRAVENOUS at 04:00

## 2018-03-17 NOTE — HISTORY & PHYSICAL EXAMINATION
DATE OF SERVICE: 03/17/2018

Physician: Marlys Biggs MD

 

HISTORY OF PRESENT ILLNESS:  This is a 67-year-old, white female with a history 
of severe COPD,

pulmonary fibrosis, continues to smoke.  She also has a history of 
hypertension.  The patient 

developed sudden shortness of breath, not manageable with her own inhalers, and 
called EMS who 

found her to have severe bronchospasm and she was given nebulizers, Solu-Medrol 
on the scene as

well as epinephrine x2.  She was transferred to the emergency room with severe 
respiratory 

distress, using accessory muscles of respiration and was started on BiPAP 
immediately in the 

ER, along with further nebulizers, magnesium was given and she had improvement 
in her 

respiratory status.  She was so comfortable on BiPAP that she requested to 
leave it on and was 

more comfortable wearing it than taking it off.  Therefore, she is being 
admitted to the ICU.

 

ALLERGIES:  NONE.

 

MEDICATIONS AT HOME

1.  Mucinex.

2.  BuSpar.

3.  Omeprazole.

4.  Cyclobenzaprine.

5.  Cozaar 50 mg daily.

6.  Amlodipine 2.5 mg daily.

7.  Zocor 20 mg daily.

8.  Albuterol.

9.  Atrovent.

10.  Baby aspirin daily.

11.  Synthroid 75 mcg daily.

12.  Nortriptyline 25 mg every evening.

13.  Ventolin HFA.

14.  Prednisone, unknown dose.

15.  Xanax, unknown dose.

 

FAMILY HISTORY:  No inherited diseases.

 

SOCIAL HISTORY:  She continues to smoke unknown amount, uses no alcohol or 
illicit drugs.

 

REVIEW OF SYSTEMS:  A comprehensive review of systems was performed from chart 
review and brief

questions to the patient since she is difficult to interrogate with her BiPAP 
mask on.  The 

pertinent positives are in the HPI.  There apparently has been no fever and she 
produces no 

sputum.

 

PHYSICAL EXAMINATION

GENERAL:  White female who is in mild respiratory distress, wearing a BiPAP.

VITAL SIGNS:  Blood pressure 143/70, heart rate 115, in sinus tachycardia, 
afebrile.  O2 

saturation on BiPAP is 95% at 2 liters.

HEENT:  Shows wearing the BiPAP, I cannot tell if she has moist oral mucosa.

NECK:  Positive JVD at a 30-degree, upright angle.  No carotid bruits.

CHEST:  Diffusely clear with poor air movement.

HEART:  Heart sounds are distant.  No audible murmur.

ABDOMEN:  Soft, obese, mildly distended.  Decreased bowel sounds, nontender.

EXTREMITIES:  Show no clubbing, cyanosis or edema.

NEUROLOGIC:  Difficult to assess because of the BiPAP and respiratory distress.

 

LABORATORIES:  Sodium 135, potassium 2.9, otherwise normal electrolytes, normal 
liver tests.  

Troponin not detectable and BNP normal at 56.  White blood count 20 with a left 
shift, 

hemoglobin 13.1, platelet count normal at 331.  Her influenza A and B are 
negative.  No INR was

done.  No urinalysis was done.

 

Chest x-ray:  Chronic, coarse interstitial opacities and pulmonary edema cannot 
be excluded.

 

IMPRESSION

1.  Acute exacerbation of chronic obstructive pulmonary disease.  She had 
extreme respiratory 

distress, using accessory muscles of respiration and BIPAP is needed.

2.  Pulmonary fibrosis history and continued smoking cigarettes.

3.  Hypertension.

4.  Tobacco use.

 

PLAN:  Place the patient in ICU, and continue with BiPAP and respiratory 
management.  Continue 

with nebulizers, steroids, empiric antibiotics for possible community-acquired 
pneumonia will 

be started:  IV ceftriaxone, IV Zithromax, IV vancomycin.

Obtain sputum if she makes it.  Blood cultures would be helpful too.  Gentle 
hydration.  Obtain

an Echo to evaluate RV function and PA pressure.  Continue with her blood 
pressure and thyroid 

medications and aspirin.

 

DEEP VENOUS THROMBOSIS PROPHYLAXIS:  SCDs.

 

CODE STATUS:  FULL CODE.

 

ATTESTATION:  The patient is expected to be discharged or transferred to 
another facility 

within 96 hours:  Yes.

 

 

DD: 03/17/2018 02:17

TD: 03/17/2018 03:28

Job #: [id number]

MTDD

## 2018-03-17 NOTE — PROVIDER PROGRESS NOTE
Subjective





- Prog Note Date


Prog Note Date: 03/17/18


Prog Note Time: 13:00





- Subjective


Pt reports feeling: Improved (The patient is breathing easier, and is oxygen at 

2 L/min at this time.  She gets very dyspneic with any type of exertion but is 

resting easily on the supplemental oxygen at this time.She denies any fevers, 

chills, pain, or insomnia)





Current Medications





- Current Medications


Current Medications: 


Acetaminophen, alprazolam, amlodipine, aspirin, azithromycin, BuSpar, 

ceftriaxone, cyclobenzaprine, D5W, famotidine, guaifenesin, DuoNeb, Xopenex,

Levothyroxine, losartan, magnesium, methylprednisolone, morphine, nortriptyline

, ondansetron, potassium chloride, sodium chloride, temazepam, vancomycin








Objective





- Vital Signs/Intake & Output


Reviewed Vital Signs: Yes


Vital Signs: 





 Vital Signs











  Temp Pulse Resp BP Pulse Ox


 


 03/17/18 16:00   109 H  23  122/71  97


 


 03/17/18 15:00   113 H  24  138/76 H  95


 


 03/17/18 14:00   115 H  23  121/74  96


 


 03/17/18 13:17  36.9 C  119 H  34 H  104/50 L  98











Intake & Output: 





 Intake & Output











 03/14/18 03/15/18 03/16/18 03/17/18





 23:59 23:59 23:59 23:59


 


Intake Total   50 2285.0


 


Output Total    1750


 


Balance   50 535.0














- Objective


General Appearance: positive: No acute distress, Alert


Eyes Bilateral: positive: Normal inspection, PERRL, EOMI, No lid inflammation, 

Conjunctivae nml, No scleral icterus


ENT: positive: ENT inspection nml, Pharynx nml, No signs of dehydration


Neck: positive: Nml inspection, Thyroid nml, No JVD, Trachea midline.  negative

: Thyromegaly


Respiratory: positive: Chest non-tender, No respiratory distress, Breath sounds 

nml.  negative: Wheezes, Rales, Rhonchi


Cardiovascular: positive: Regular rate & rhythm, No murmur, No gallop


Abdomen: positive: Non-tender, No organomegaly, Nml bowel sounds, No 

distention.  negative: Guarding, Rebound


Back: positive: Nml inspection.  negative: CVA tenderness (R), CVA tenderness (L

)


Skin: positive: Color nml, No rash, Warm, Dry.  negative: Cyanosis


Extremities: positive: Non-tender, Full ROM, Nml appearance, No pedal edema


Neurologic/Psychiatric: positive: Oriented x3, CN's nml (2-12), Motor nml, 

Sensation nml, Mood/affect nml





- Lab Results


Fish Bones: 


 03/17/18 05:05





 03/17/18 05:05


Other Labs: 





 Lab Results x24hrs











  03/17/18 03/17/18 03/17/18 Range/Units





  05:05 05:05 05:05 


 


WBC     (4.8-10.8)  x10^3/uL


 


RBC     (4.20-5.40)  10^6/uL


 


Hgb     (12.0-16.0)  g/dL


 


Hct     (37.0-47.0)  %


 


MCV     (81.0-99.0)  fL


 


MCH     (27.0-31.0)  pg


 


MCHC     (32.0-36.0)  g/dL


 


RDW     (12.0-15.0)  %


 


Plt Count     (130-450)  10^3/uL


 


MPV     (7.9-10.8)  fL


 


Neut #     (1.5-6.6)  10^3/uL


 


Lymph #     (1.5-3.5)  10^3/uL


 


Mono #     (0.0-1.0)  10^3/uL


 


Eos #     (0.0-0.7)  10^3/uL


 


Baso #     (0.0-0.1)  10^3/uL


 


Absolute Nucleated RBC     x10^3/uL


 


Nucleated RBC %     /100WBC


 


PT    11.4  (9.9-12.6)  secs


 


INR    1.0  (0.8-1.2)  


 


Sodium   135   (135-145)  mmol/L


 


Potassium   3.7   (3.5-5.0)  mmol/L


 


Chloride   103   (101-111)  mmol/L


 


Carbon Dioxide   21   (21-32)  mmol/L


 


Anion Gap   11.0   (6-13)  


 


BUN   15   (6-20)  mg/dL


 


Creatinine   0.6   (0.4-1.0)  mg/dL


 


Estimated GFR (MDRD)   100   (>89)  


 


Glucose   233 H   ()  mg/dL


 


Calcium   8.1 L   (8.5-10.3)  mg/dL


 


Phosphorus   3.1   (2.5-4.6)  mg/dL


 


Magnesium   2.1   (1.7-2.8)  mg/dL


 


Troponin I  < 0.04    (<0.49)  ng/mL


 


Influenza A (Rapid)     (Negative)  


 


Influenza B (Rapid)     (Negative)  


 


Influenza Types A,B Ag     














  03/17/18 03/17/18 Range/Units





  05:05 01:10 


 


WBC  12.0 H   (4.8-10.8)  x10^3/uL


 


RBC  4.07 L   (4.20-5.40)  10^6/uL


 


Hgb  12.3   (12.0-16.0)  g/dL


 


Hct  38.0   (37.0-47.0)  %


 


MCV  93.4   (81.0-99.0)  fL


 


MCH  30.2   (27.0-31.0)  pg


 


MCHC  32.3   (32.0-36.0)  g/dL


 


RDW  13.3   (12.0-15.0)  %


 


Plt Count  274   (130-450)  10^3/uL


 


MPV  7.7 L   (7.9-10.8)  fL


 


Neut #  11.0 H   (1.5-6.6)  10^3/uL


 


Lymph #  0.8 L   (1.5-3.5)  10^3/uL


 


Mono #  0.1   (0.0-1.0)  10^3/uL


 


Eos #  0.0   (0.0-0.7)  10^3/uL


 


Baso #  0.0   (0.0-0.1)  10^3/uL


 


Absolute Nucleated RBC  0.00   x10^3/uL


 


Nucleated RBC %  0.0   /100WBC


 


PT    (9.9-12.6)  secs


 


INR    (0.8-1.2)  


 


Sodium    (135-145)  mmol/L


 


Potassium    (3.5-5.0)  mmol/L


 


Chloride    (101-111)  mmol/L


 


Carbon Dioxide    (21-32)  mmol/L


 


Anion Gap    (6-13)  


 


BUN    (6-20)  mg/dL


 


Creatinine    (0.4-1.0)  mg/dL


 


Estimated GFR (MDRD)    (>89)  


 


Glucose    ()  mg/dL


 


Calcium    (8.5-10.3)  mg/dL


 


Phosphorus    (2.5-4.6)  mg/dL


 


Magnesium    (1.7-2.8)  mg/dL


 


Troponin I    (<0.49)  ng/mL


 


Influenza A (Rapid)   Negative  (Negative)  


 


Influenza B (Rapid)   Negative  (Negative)  


 


Influenza Types A,B Ag   -  














- Diagnostic Imaging


Diagnostic Imaging Results: positive: Final report reviewed


Diagnostic Imaging Comments: 


EXAM: 


CHEST RADIOGRAPHY 





EXAM DATE: 3/16/2018 10:51 PM. 





CLINICAL HISTORY: Hypoxia, chronic obstructive pulmonary disease. 





COMPARISON: 11/21/2011, 11/07/2011 CT, 06/30/2011. 





TECHNIQUE: 1 view. 





FINDINGS: 


Lungs/Pleura: Chronic coarse interstitial opacities without definite focal 

pneumonia or 


superimposed edema. No gross pneumothorax or large effusion. 





Mediastinum: Within exam limitations, the cardiomediastinal contour is normal. 





Other: Previous cervical surgery. 





IMPRESSION: Chronic coarse interstitial opacities, likely mostly related to 

patient's given 


history of COPD. Difficult to entirely exclude very mild superimposed 

interstitial pulmonary edema. 














Assessment/Plan





- Problem List


(1) Acute exacerbation of chronic obstructive pulmonary disease (COPD)


Impression: 


The patient has been admitted to the intensive care unit and placed on steroids 

both oral and IV and inhaled, as well as bronchodilators and supplemental 

oxygen.  Her oxygen requirement has dropped down to about 2 L/min and she still 

gets very dyspneic with any type of exertion.  We will continue the present 

care and consider discharging the patient home when she no longer is oxygen 

dependent.She has been counseled multiple times on the importance of smoking 

cessation especially in light of her pulmonary fibrosis however despite this 

she does continue to smoke.








(2) Pulmonary fibrosis


Impression: 


By history, no doubt contributing to this current exacerbation of COPD.  

Unfortunately there is no known effective treatment for pulmonary fibrosis.








(3) Tobacco dependence


Impression: 


The patient has been counseled multiple times on the importance of smoking 

cessation however continues to smoke.  We will attempt once again to educate 

her on this matter.

## 2018-03-18 LAB
ANION GAP SERPL CALCULATED.4IONS-SCNC: 8 MMOL/L (ref 6–13)
BUN SERPL-MCNC: 12 MG/DL (ref 6–20)
CALCIUM UR-MCNC: 8.7 MG/DL (ref 8.5–10.3)
CHLORIDE SERPL-SCNC: 104 MMOL/L (ref 101–111)
CO2 SERPL-SCNC: 26 MMOL/L (ref 21–32)
CREAT SERPLBLD-SCNC: 0.6 MG/DL (ref 0.4–1)
ERYTHROCYTE [DISTWIDTH] IN BLOOD BY AUTOMATED COUNT: 13.3 % (ref 12–15)
GFRSERPLBLD MDRD-ARVRAT: 100 ML/MIN/{1.73_M2} (ref 89–?)
GLUCOSE SERPL-MCNC: 163 MG/DL (ref 70–100)
HGB UR QL STRIP: 12.9 G/DL (ref 12–16)
MCH RBC QN AUTO: 30.8 PG (ref 27–31)
MCHC RBC AUTO-ENTMCNC: 33 G/DL (ref 32–36)
MCV RBC AUTO: 93.5 FL (ref 81–99)
NEUTROPHILS # SNV AUTO: 15.6 X10^3/UL (ref 4.8–10.8)
PDW BLD AUTO: 8.2 FL (ref 7.9–10.8)
PLATELET # BLD: 283 10^3/UL (ref 130–450)
RBC MAR: 4.18 10^6/UL (ref 4.2–5.4)
SODIUM SERPLBLD-SCNC: 138 MMOL/L (ref 135–145)
VANCOMYCIN TROUGH SERPL-MCNC: 6.2 UG/ML (ref 5–15)

## 2018-03-18 RX ADMIN — SODIUM CHLORIDE, PRESERVATIVE FREE SCH ML: 5 INJECTION INTRAVENOUS at 00:33

## 2018-03-18 RX ADMIN — CYCLOBENZAPRINE SCH MG: 10 TABLET, FILM COATED ORAL at 08:55

## 2018-03-18 RX ADMIN — SODIUM CHLORIDE SCH MLS/HR: 9 INJECTION, SOLUTION INTRAVENOUS at 12:55

## 2018-03-18 RX ADMIN — NORTRIPTYLINE HYDROCHLORIDE SCH MG: 25 CAPSULE ORAL at 21:49

## 2018-03-18 RX ADMIN — SODIUM CHLORIDE, PRESERVATIVE FREE SCH ML: 5 INJECTION INTRAVENOUS at 16:58

## 2018-03-18 RX ADMIN — SODIUM CHLORIDE SCH MLS/HR: 9 INJECTION, SOLUTION INTRAVENOUS at 00:33

## 2018-03-18 RX ADMIN — SODIUM CHLORIDE, PRESERVATIVE FREE SCH ML: 5 INJECTION INTRAVENOUS at 08:56

## 2018-03-18 RX ADMIN — LEVOTHYROXINE SODIUM SCH MCG: 0.1 TABLET ORAL at 06:31

## 2018-03-18 RX ADMIN — FAMOTIDINE SCH MG: 20 TABLET, FILM COATED ORAL at 08:55

## 2018-03-18 RX ADMIN — ASPIRIN SCH MG: 81 TABLET, COATED ORAL at 08:54

## 2018-03-18 RX ADMIN — METHYLPREDNISOLONE SODIUM SUCCINATE SCH MG: 40 INJECTION, POWDER, FOR SOLUTION INTRAMUSCULAR; INTRAVENOUS at 18:09

## 2018-03-18 RX ADMIN — LOSARTAN POTASSIUM SCH MG: 50 TABLET, FILM COATED ORAL at 08:55

## 2018-03-18 RX ADMIN — METHYLPREDNISOLONE SODIUM SUCCINATE SCH MG: 40 INJECTION, POWDER, FOR SOLUTION INTRAMUSCULAR; INTRAVENOUS at 06:31

## 2018-03-18 RX ADMIN — IPRATROPIUM BROMIDE AND ALBUTEROL SULFATE PRN ML: 2.5; .5 SOLUTION RESPIRATORY (INHALATION) at 22:25

## 2018-03-18 RX ADMIN — SODIUM CHLORIDE, PRESERVATIVE FREE PRN ML: 5 INJECTION INTRAVENOUS at 06:31

## 2018-03-18 RX ADMIN — DEXTROSE MONOHYDRATE SCH MLS/HR: 50 INJECTION, SOLUTION INTRAVENOUS at 08:20

## 2018-03-18 RX ADMIN — SODIUM CHLORIDE SCH MLS/HR: 9 INJECTION, SOLUTION INTRAVENOUS at 21:49

## 2018-03-18 RX ADMIN — METHYLPREDNISOLONE SODIUM SUCCINATE SCH MG: 40 INJECTION, POWDER, FOR SOLUTION INTRAMUSCULAR; INTRAVENOUS at 00:33

## 2018-03-18 RX ADMIN — METHYLPREDNISOLONE SODIUM SUCCINATE SCH MG: 40 INJECTION, POWDER, FOR SOLUTION INTRAMUSCULAR; INTRAVENOUS at 12:00

## 2018-03-18 RX ADMIN — SODIUM CHLORIDE, PRESERVATIVE FREE PRN ML: 5 INJECTION INTRAVENOUS at 18:09

## 2018-03-18 RX ADMIN — IPRATROPIUM BROMIDE AND ALBUTEROL SULFATE PRN ML: 2.5; .5 SOLUTION RESPIRATORY (INHALATION) at 09:35

## 2018-03-18 NOTE — PROVIDER PROGRESS NOTE
Subjective





- Prog Note Date


Prog Note Date: 03/18/18


Prog Note Time: 14:05





- Subjective


Pt reports feeling: Improved (The patient is breathing easier and was able to 

stay off of supplemental oxygen for about 3 hours this morning.  She still gets 

very dyspneic with any type of exertion, her appetite remains poor to fair, she 

is moving her bowels, and she denies any fevers or chills.)





Current Medications





- Current Medications


Current Medications: 


Acetaminophen, alprazolam, amlodipine, aspirin, azithromycin, BuSpar, 

ceftriaxone, cyclobenzaprine, D5W, famotidine, guaifenesin, DuoNeb, Xopenex,

Levothyroxine, losartan, magnesium, methylprednisolone, morphine, nortriptyline

, ondansetron, potassium chloride, sodium chloride, temazepam, vancomycin











Objective





- Vital Signs/Intake & Output


Reviewed Vital Signs: Yes


Vital Signs: 





 Vital Signs











  Temp Pulse Resp BP Pulse Ox


 


 03/18/18 13:00  36.8 C  118 H  21  128/89 H  92











Intake & Output: 





 Intake & Output











 03/15/18 03/16/18 03/17/18 03/18/18





 23:59 23:59 23:59 23:59


 


Intake Total  50 3978.000 1275


 


Output Total   3150 2450


 


Balance  50 828.000 -1175














- Objective


General Appearance: positive: No acute distress, Alert


Eyes Bilateral: positive: Normal inspection, PERRL, EOMI, No lid inflammation, 

Conjunctivae nml, No scleral icterus


ENT: positive: ENT inspection nml, Pharynx nml, No signs of dehydration


Neck: positive: Nml inspection, Thyroid nml, No JVD, Trachea midline.  negative

: Thyromegaly


Respiratory: positive: Chest non-tender, No respiratory distress, Breath sounds 

nml.  negative: Wheezes, Rales, Rhonchi


Cardiovascular: positive: Regular rate & rhythm, No murmur, No gallop


Abdomen: positive: Non-tender, No organomegaly, Nml bowel sounds, No 

distention.  negative: Guarding, Rebound


Back: positive: Nml inspection.  negative: CVA tenderness (R), CVA tenderness (L

)


Skin: positive: Color nml, No rash, Warm, Dry.  negative: Cyanosis


Extremities: positive: Non-tender, Full ROM, Nml appearance, No pedal edema


Neurologic/Psychiatric: positive: Oriented x3, CN's nml (2-12), Motor nml, 

Sensation nml, Mood/affect nml





- Lab Results


Fish Bones: 


 03/18/18 05:00





 03/18/18 05:00


Other Labs: 





 Lab Results x24hrs











  03/18/18 03/18/18 03/18/18 Range/Units





  12:26 05:00 05:00 


 


WBC    15.6 H  (4.8-10.8)  x10^3/uL


 


RBC    4.18 L  (4.20-5.40)  10^6/uL


 


Hgb    12.9  (12.0-16.0)  g/dL


 


Hct    39.1  (37.0-47.0)  %


 


MCV    93.5  (81.0-99.0)  fL


 


MCH    30.8  (27.0-31.0)  pg


 


MCHC    33.0  (32.0-36.0)  g/dL


 


RDW    13.3  (12.0-15.0)  %


 


Plt Count    283  (130-450)  10^3/uL


 


MPV    8.2  (7.9-10.8)  fL


 


Sodium   138   (135-145)  mmol/L


 


Potassium   4.1   (3.5-5.0)  mmol/L


 


Chloride   104   (101-111)  mmol/L


 


Carbon Dioxide   26   (21-32)  mmol/L


 


Anion Gap   8.0   (6-13)  


 


BUN   12   (6-20)  mg/dL


 


Creatinine   0.6   (0.4-1.0)  mg/dL


 


Estimated GFR (MDRD)   100   (>89)  


 


Glucose   163 H   ()  mg/dL


 


Calcium   8.7   (8.5-10.3)  mg/dL


 


Phosphorus     (2.5-4.6)  mg/dL


 


Last Dose Date  3/18/18    


 


Last Dose Time  0203    


 


Vancomycin Trough  6.2    (5.0-15.0)  ug/mL














  03/18/18 Range/Units





  04:59 


 


WBC   (4.8-10.8)  x10^3/uL


 


RBC   (4.20-5.40)  10^6/uL


 


Hgb   (12.0-16.0)  g/dL


 


Hct   (37.0-47.0)  %


 


MCV   (81.0-99.0)  fL


 


MCH   (27.0-31.0)  pg


 


MCHC   (32.0-36.0)  g/dL


 


RDW   (12.0-15.0)  %


 


Plt Count   (130-450)  10^3/uL


 


MPV   (7.9-10.8)  fL


 


Sodium   (135-145)  mmol/L


 


Potassium   (3.5-5.0)  mmol/L


 


Chloride   (101-111)  mmol/L


 


Carbon Dioxide   (21-32)  mmol/L


 


Anion Gap   (6-13)  


 


BUN   (6-20)  mg/dL


 


Creatinine   (0.4-1.0)  mg/dL


 


Estimated GFR (MDRD)   (>89)  


 


Glucose   ()  mg/dL


 


Calcium   (8.5-10.3)  mg/dL


 


Phosphorus  3.0  (2.5-4.6)  mg/dL


 


Last Dose Date   


 


Last Dose Time   


 


Vancomycin Trough   (5.0-15.0)  ug/mL














Assessment/Plan





- Problem List


(1) Acute exacerbation of chronic obstructive pulmonary disease (COPD)


Impression: 


The patient has been admitted to the intensive care unit and placed on steroids 

both oral and IV and inhaled, as well as bronchodilators and supplemental 

oxygen.  Her oxygen requirement has dropped down to about 2 L/min (with up to 3 

hours off of supplemental oxygen completely) and she still gets very dyspneic 

with any type of exertion.  We will continue the present care and We will 

transfer the patient back to the medical surgical floor as she no longer is 

oxygen dependent. She has been counseled multiple times on the importance of 

smoking cessation especially in light of her pulmonary fibrosis however despite 

this she does continue to smoke.











(2) Pulmonary fibrosis


Impression: 


By history, no doubt contributing to this current exacerbation of COPD.  

Unfortunately there is no known effective treatment for pulmonary fibrosis.











(3) Tobacco dependence


Impression: 


The patient has been counseled multiple times on the importance of smoking 

cessation however continues to smoke.  We will attempt once again to educate 

her on this matter. The patient says she is going to quit smoking again.

## 2018-03-19 VITALS — DIASTOLIC BLOOD PRESSURE: 103 MMHG | SYSTOLIC BLOOD PRESSURE: 154 MMHG

## 2018-03-19 LAB
ANION GAP SERPL CALCULATED.4IONS-SCNC: 9 MMOL/L (ref 6–13)
BUN SERPL-MCNC: 16 MG/DL (ref 6–20)
CALCIUM UR-MCNC: 8.7 MG/DL (ref 8.5–10.3)
CHLORIDE SERPL-SCNC: 103 MMOL/L (ref 101–111)
CO2 SERPL-SCNC: 26 MMOL/L (ref 21–32)
CREAT SERPLBLD-SCNC: 0.5 MG/DL (ref 0.4–1)
ERYTHROCYTE [DISTWIDTH] IN BLOOD BY AUTOMATED COUNT: 13.3 % (ref 12–15)
GFRSERPLBLD MDRD-ARVRAT: 124 ML/MIN/{1.73_M2} (ref 89–?)
GLUCOSE SERPL-MCNC: 169 MG/DL (ref 70–100)
HGB UR QL STRIP: 12.6 G/DL (ref 12–16)
MCH RBC QN AUTO: 29.9 PG (ref 27–31)
MCHC RBC AUTO-ENTMCNC: 32.1 G/DL (ref 32–36)
MCV RBC AUTO: 93.2 FL (ref 81–99)
NEUTROPHILS # SNV AUTO: 13.8 X10^3/UL (ref 4.8–10.8)
PDW BLD AUTO: 7.8 FL (ref 7.9–10.8)
PHOSPHATE BLD-MCNC: 3.4 MG/DL (ref 2.5–4.6)
PLATELET # BLD: 308 10^3/UL (ref 130–450)
RBC MAR: 4.23 10^6/UL (ref 4.2–5.4)
SODIUM SERPLBLD-SCNC: 138 MMOL/L (ref 135–145)
VANCOMYCIN TROUGH SERPL-MCNC: 4.9 UG/ML (ref 5–15)

## 2018-03-19 RX ADMIN — LEVOTHYROXINE SODIUM SCH MCG: 0.1 TABLET ORAL at 06:10

## 2018-03-19 RX ADMIN — DEXTROSE MONOHYDRATE SCH MLS/HR: 50 INJECTION, SOLUTION INTRAVENOUS at 09:04

## 2018-03-19 RX ADMIN — CYCLOBENZAPRINE SCH MG: 10 TABLET, FILM COATED ORAL at 09:04

## 2018-03-19 RX ADMIN — METHYLPREDNISOLONE SODIUM SUCCINATE SCH MG: 40 INJECTION, POWDER, FOR SOLUTION INTRAMUSCULAR; INTRAVENOUS at 00:39

## 2018-03-19 RX ADMIN — FAMOTIDINE SCH MG: 20 TABLET, FILM COATED ORAL at 09:04

## 2018-03-19 RX ADMIN — SODIUM CHLORIDE SCH MLS/HR: 9 INJECTION, SOLUTION INTRAVENOUS at 00:39

## 2018-03-19 RX ADMIN — ASPIRIN SCH MG: 81 TABLET, COATED ORAL at 09:04

## 2018-03-19 RX ADMIN — LOSARTAN POTASSIUM SCH MG: 50 TABLET, FILM COATED ORAL at 09:04

## 2018-03-19 RX ADMIN — SODIUM CHLORIDE, PRESERVATIVE FREE SCH ML: 5 INJECTION INTRAVENOUS at 09:03

## 2018-03-19 RX ADMIN — SODIUM CHLORIDE, PRESERVATIVE FREE SCH ML: 5 INJECTION INTRAVENOUS at 00:39

## 2018-03-19 RX ADMIN — METHYLPREDNISOLONE SODIUM SUCCINATE SCH MG: 40 INJECTION, POWDER, FOR SOLUTION INTRAMUSCULAR; INTRAVENOUS at 06:10

## 2018-03-19 NOTE — DISCHARGE PLAN
Discharge Plan


Disposition: 01 Home, Self Care


Condition: Stable


Prescriptions: 


Ipratropium/Albuterol [Duoneb] 3 ml INH Q4HR PRN #12 amp


 PRN Reason: Wheezing


amLODIPine [Norvasc] 5 mg PO DAILY #20 tablet


Budesonide/Formoterol Fumarate [Symbicort 160-4.5 Mcg Inhaler] 10.2 gm IH DAILY 

#1 hfa.aer.ad


Diet: Regular


Activity Restrictions: Activity as Tolerated


Shower Restrictions: No


Driving Restrictions: No


Weight Bearing: Full Weight


No Smoking: If you smoke, Please STOP!  Call [phone] for help.


Follow-up with: 


Bianca Perdomo PA-C [Primary Care Provider] -

## 2018-03-19 NOTE — DISCHARGE SUMMARY
Discharge Summary


Admit Date: 03/17/18


Discharge Date: 03/19/18


Discharging Provider: Gem Johnson DO


Primary Care Provider: Bianca Perdomo


Code Status: Attempt Resuscitation


Condition at Discharge: Stable


Discharge Disposition: 01 Home, Self Care





- DIAGNOSES


Admission Diagnoses: 


1.  Acute exacerbation of COPD 


2.  Pulmonary fibrosis history 


3.  Continued tobaccoism/tobacco use 


4.  Hypertension





Discharge Diagnoses with Status of Each Condition: 


1.  Acute exacerbation of COPD- Resolving/resolved.  The patient no longer 

requires supplemental oxygen.  He is ambulating with some shortness of breath 

but still able to perform her ADLs. Continue budesonide, Follow-up with your 

primary care provider, pulmonologist in the next week.


2.  Pulmonary fibrosis history 


3.  Continued tobaccoism/tobacco use -You must stop smoking.


4.  Hypertension- Resume your home medications.








- HPI


History of Present Illness: 


From Dr Biggs's H&P:





Patient is a 67-year-old white female with a history of severe COPD, pulmonary 

fibrosis, and tobaccoism who continues to smoke.  She also has a history of 

hypertension.  The patient developed sudden shortness of breath which was not 

manageable with her inhalers and called EMS who found her to have severe 

bronchospasms.  She was given nebulizers, Solu-Medrol and epinephrine twice on 

scene.  She was brought into the emergency department with severe respiratory 

distress and was found to be using accessory muscles of respiration.  She was 

started on BiPAP immediately in emergency department along with further 

nebulizers and magnesium.  Patient's respiratory status improved and she was so 

comfortable on BiPAP that she requested that it be left on.  She was admitted 

to the intensive care unit





- HOSPITAL COURSE


Hospital Course: 





The patient was admitted to the intensive care unit and placed on steroids and 

bronchodilators.  Her respiratory status began to slowly improve and she was 

able to be brought off of supplemental oxygen and transferred to the medical 

surgical floor.  She did well ambulating on the jaramillo without oxygen and was 

therefore deemed ready to be discharged.





- ALLERGIES


Allergies/Adverse Reactions: 


 Allergies











Allergy/AdvReac Type Severity Reaction Status Date / Time


 


No Known Drug Allergies Allergy   Verified 03/16/18 22:20














- MEDICATIONS


Home Medications: 


 Ambulatory Orders











 Medication  Instructions  Recorded  Confirmed


 


ALPRAZolam [Xanax] 0.25 - 0.5 mg PO DAILY PRN 07/10/13 03/17/18


 


Amlodipine Besylate 5 mg PO DAILY 07/10/13 03/17/18


 


Aspirin [Aspir 81] 81 mg PO DAILY 07/10/13 03/17/18


 


Cyclobenzaprine HCl 10 mg PO TID PRN 07/10/13 03/17/18


 


Levothyroxine [Synthroid] 100 mcg PO QDAC 07/10/13 03/17/18


 


Losartan [Cozaar] 100 mg PO DAILY 07/10/13 03/17/18


 


Nortriptyline HCl 25 mg PO QPM 07/10/13 03/17/18


 


Simvastatin [Zocor] 20 mg PO DAILY PM 07/10/13 03/17/18


 


Acetaminophen 500 mg PO BID PRN 03/17/18 03/17/18


 


Albuterol 3 ml INH Q4H PRN 03/17/18 03/17/18


 


Albuterol Sulfate [Proair Hfa 2 puffs INH Q4H PRN 03/17/18 03/17/18





Inhaler]   


 


Ipratropium [Atrovent] 2.5 ml INH Q4H PRN 03/17/18 03/17/18


 


Omeprazole 20 mg PO QDAC 03/17/18 03/17/18


 


busPIRone [Buspar] 30 mg PO BID 03/17/18 03/17/18


 


guaiFENesin [Mucinex] 600 mg PO DAILY 03/17/18 03/17/18


 


Budesonide/Formoterol Fumarate 10.2 gm IH DAILY #1 hfa.aer.ad 03/19/18 





[Symbicort 160-4.5 Mcg Inhaler]   


 


Ipratropium/Albuterol [Duoneb] 3 ml INH Q4HR PRN #12 amp 03/19/18 


 


amLODIPine [Norvasc] 5 mg PO DAILY #20 tablet 03/19/18 


 


guaiFENesin [Mucinex] 600 mg PO BID  tablet 03/19/18 


 


guaiFENesin/CODEINE [Robitussin AC] 5 ml PO Q6H PRN  udc 03/19/18 














- PHYSICAL EXAM AT DISCHARGE


General Appearance: positive: No acute distress, Alert, Mild distress


Eyes Bilateral: positive: Normal inspection, PERRL, EOMI, No lid inflammation, 

Conjunctivae nml, No scleral icterus


ENT: positive: ENT inspection nml, Pharynx nml, No signs of dehydration


Neck: positive: Nml inspection, Thyroid nml, No JVD, Trachea midline.  negative

: Thyromegaly


Respiratory: positive: Chest non-tender, No respiratory distress, Breath sounds 

nml.  negative: Wheezes, Rales, Rhonchi


Cardiovascular: positive: Regular rate & rhythm, No murmur, No gallop


Peripheral Pulses: positive: 1+


Abdomen: positive: Non-tender, No organomegaly, Nml bowel sounds, No 

distention.  negative: Guarding, Rebound


Back: positive: Nml inspection.  negative: CVA tenderness (R), CVA tenderness (L

)


Skin: positive: Color nml, No rash, Warm, Dry.  negative: Cyanosis


Extremities: positive: Non-tender, Full ROM, Nml appearance, No pedal edema


Neurologic/Psychiatric: positive: Oriented x3, CN's nml (2-12), Motor nml, 

Sensation nml, Mood/affect nml





- LABS


Result Diagrams: 


 03/19/18 04:57





 03/19/18 04:57





- FOLLOW UP


Follow Up: 





Follow-up with Bianca Perdomo next week.





- TIME SPENT


Time Spent in Discharge (Minutes): 40

## 2023-05-19 ENCOUNTER — HOSPITAL ENCOUNTER (OUTPATIENT)
Dept: HOSPITAL 76 - EMS | Age: 73
End: 2023-05-19
Payer: COMMERCIAL

## 2023-05-19 DIAGNOSIS — R06.00: Primary | ICD-10-CM

## 2023-08-30 ENCOUNTER — HOSPITAL ENCOUNTER (OUTPATIENT)
Dept: HOSPITAL 76 - EMS | Age: 73
End: 2023-08-30
Payer: MEDICARE

## 2023-08-30 DIAGNOSIS — R06.02: Primary | ICD-10-CM

## 2023-08-31 ENCOUNTER — HOSPITAL ENCOUNTER (INPATIENT)
Dept: HOSPITAL 73 - ED | Age: 73
LOS: 1 days | Discharge: HOME | DRG: 177 | End: 2023-09-01
Payer: MEDICARE

## 2023-08-31 ENCOUNTER — HOSPITAL ENCOUNTER (OUTPATIENT)
Dept: HOSPITAL 76 - EMS | Age: 73
Discharge: ANOTHER HOSPITAL | End: 2023-08-31
Payer: MEDICARE

## 2023-08-31 VITALS — BODY MASS INDEX: 23 KG/M2

## 2023-08-31 VITALS — SYSTOLIC BLOOD PRESSURE: 161 MMHG | OXYGEN SATURATION: 92 % | DIASTOLIC BLOOD PRESSURE: 77 MMHG | HEART RATE: 99 BPM

## 2023-08-31 VITALS
DIASTOLIC BLOOD PRESSURE: 59 MMHG | HEART RATE: 82 BPM | SYSTOLIC BLOOD PRESSURE: 125 MMHG | RESPIRATION RATE: 16 BRPM | OXYGEN SATURATION: 92 %

## 2023-08-31 VITALS
OXYGEN SATURATION: 100 % | HEART RATE: 88 BPM | RESPIRATION RATE: 20 BRPM | SYSTOLIC BLOOD PRESSURE: 126 MMHG | DIASTOLIC BLOOD PRESSURE: 63 MMHG

## 2023-08-31 VITALS — DIASTOLIC BLOOD PRESSURE: 60 MMHG | OXYGEN SATURATION: 96 % | SYSTOLIC BLOOD PRESSURE: 114 MMHG | HEART RATE: 97 BPM

## 2023-08-31 VITALS — HEART RATE: 102 BPM | OXYGEN SATURATION: 94 %

## 2023-08-31 VITALS — OXYGEN SATURATION: 85 % | HEART RATE: 101 BPM | RESPIRATION RATE: 46 BRPM

## 2023-08-31 VITALS — OXYGEN SATURATION: 93 % | SYSTOLIC BLOOD PRESSURE: 148 MMHG | DIASTOLIC BLOOD PRESSURE: 73 MMHG | HEART RATE: 101 BPM

## 2023-08-31 VITALS
OXYGEN SATURATION: 91 % | SYSTOLIC BLOOD PRESSURE: 136 MMHG | HEART RATE: 98 BPM | RESPIRATION RATE: 23 BRPM | DIASTOLIC BLOOD PRESSURE: 63 MMHG

## 2023-08-31 VITALS — OXYGEN SATURATION: 95 % | HEART RATE: 85 BPM | RESPIRATION RATE: 20 BRPM

## 2023-08-31 VITALS — RESPIRATION RATE: 22 BRPM | OXYGEN SATURATION: 96 % | TEMPERATURE: 97.88 F | HEART RATE: 87 BPM

## 2023-08-31 VITALS — OXYGEN SATURATION: 96 % | RESPIRATION RATE: 20 BRPM | HEART RATE: 101 BPM

## 2023-08-31 VITALS
OXYGEN SATURATION: 91 % | DIASTOLIC BLOOD PRESSURE: 109 MMHG | SYSTOLIC BLOOD PRESSURE: 145 MMHG | RESPIRATION RATE: 27 BRPM | HEART RATE: 103 BPM

## 2023-08-31 VITALS
TEMPERATURE: 97.88 F | SYSTOLIC BLOOD PRESSURE: 138 MMHG | HEART RATE: 71 BPM | OXYGEN SATURATION: 96 % | DIASTOLIC BLOOD PRESSURE: 59 MMHG | RESPIRATION RATE: 29 BRPM

## 2023-08-31 VITALS
DIASTOLIC BLOOD PRESSURE: 94 MMHG | OXYGEN SATURATION: 94 % | SYSTOLIC BLOOD PRESSURE: 135 MMHG | HEART RATE: 103 BPM | RESPIRATION RATE: 26 BRPM

## 2023-08-31 VITALS
SYSTOLIC BLOOD PRESSURE: 125 MMHG | HEART RATE: 80 BPM | TEMPERATURE: 97 F | RESPIRATION RATE: 20 BRPM | DIASTOLIC BLOOD PRESSURE: 59 MMHG | OXYGEN SATURATION: 93 %

## 2023-08-31 VITALS
RESPIRATION RATE: 35 BRPM | RESPIRATION RATE: 35 BRPM | HEART RATE: 100 BPM | OXYGEN SATURATION: 94 % | DIASTOLIC BLOOD PRESSURE: 98 MMHG | RESPIRATION RATE: 35 BRPM | HEART RATE: 100 BPM | OXYGEN SATURATION: 94 % | HEART RATE: 100 BPM | OXYGEN SATURATION: 94 % | HEART RATE: 100 BPM | RESPIRATION RATE: 35 BRPM | HEART RATE: 100 BPM | OXYGEN SATURATION: 94 % | RESPIRATION RATE: 35 BRPM | HEART RATE: 100 BPM | RESPIRATION RATE: 35 BRPM | OXYGEN SATURATION: 94 % | OXYGEN SATURATION: 94 % | SYSTOLIC BLOOD PRESSURE: 170 MMHG | OXYGEN SATURATION: 94 % | HEART RATE: 100 BPM | RESPIRATION RATE: 15 BRPM

## 2023-08-31 VITALS
DIASTOLIC BLOOD PRESSURE: 96 MMHG | SYSTOLIC BLOOD PRESSURE: 170 MMHG | RESPIRATION RATE: 38 BRPM | OXYGEN SATURATION: 87 % | HEART RATE: 101 BPM

## 2023-08-31 VITALS — HEART RATE: 109 BPM | OXYGEN SATURATION: 86 %

## 2023-08-31 VITALS — HEART RATE: 92 BPM | OXYGEN SATURATION: 96 %

## 2023-08-31 VITALS
HEART RATE: 75 BPM | OXYGEN SATURATION: 94 % | RESPIRATION RATE: 25 BRPM | SYSTOLIC BLOOD PRESSURE: 138 MMHG | DIASTOLIC BLOOD PRESSURE: 60 MMHG

## 2023-08-31 VITALS
DIASTOLIC BLOOD PRESSURE: 66 MMHG | OXYGEN SATURATION: 96 % | TEMPERATURE: 97.8 F | SYSTOLIC BLOOD PRESSURE: 133 MMHG | RESPIRATION RATE: 23 BRPM | HEART RATE: 102 BPM

## 2023-08-31 VITALS — SYSTOLIC BLOOD PRESSURE: 143 MMHG | HEART RATE: 98 BPM | DIASTOLIC BLOOD PRESSURE: 66 MMHG | OXYGEN SATURATION: 96 %

## 2023-08-31 VITALS — OXYGEN SATURATION: 97 % | HEART RATE: 87 BPM

## 2023-08-31 VITALS — HEART RATE: 101 BPM | RESPIRATION RATE: 26 BRPM | OXYGEN SATURATION: 94 %

## 2023-08-31 VITALS — HEART RATE: 94 BPM | OXYGEN SATURATION: 94 % | RESPIRATION RATE: 24 BRPM

## 2023-08-31 VITALS
SYSTOLIC BLOOD PRESSURE: 139 MMHG | OXYGEN SATURATION: 92 % | DIASTOLIC BLOOD PRESSURE: 73 MMHG | RESPIRATION RATE: 24 BRPM | HEART RATE: 91 BPM

## 2023-08-31 VITALS
SYSTOLIC BLOOD PRESSURE: 114 MMHG | DIASTOLIC BLOOD PRESSURE: 58 MMHG | HEART RATE: 93 BPM | OXYGEN SATURATION: 93 % | RESPIRATION RATE: 26 BRPM

## 2023-08-31 VITALS — HEART RATE: 93 BPM | OXYGEN SATURATION: 97 %

## 2023-08-31 VITALS — HEART RATE: 80 BPM | RESPIRATION RATE: 24 BRPM | OXYGEN SATURATION: 93 %

## 2023-08-31 VITALS — OXYGEN SATURATION: 97 % | HEART RATE: 98 BPM

## 2023-08-31 VITALS — OXYGEN SATURATION: 92 % | HEART RATE: 91 BPM

## 2023-08-31 VITALS — HEART RATE: 136 BPM | OXYGEN SATURATION: 92 %

## 2023-08-31 VITALS
DIASTOLIC BLOOD PRESSURE: 60 MMHG | HEART RATE: 103 BPM | TEMPERATURE: 97.88 F | RESPIRATION RATE: 22 BRPM | OXYGEN SATURATION: 97 % | SYSTOLIC BLOOD PRESSURE: 114 MMHG

## 2023-08-31 VITALS — HEART RATE: 86 BPM | OXYGEN SATURATION: 97 %

## 2023-08-31 VITALS — OXYGEN SATURATION: 92 % | HEART RATE: 102 BPM

## 2023-08-31 VITALS — HEART RATE: 84 BPM | OXYGEN SATURATION: 94 % | RESPIRATION RATE: 22 BRPM

## 2023-08-31 VITALS — OXYGEN SATURATION: 95 % | HEART RATE: 99 BPM

## 2023-08-31 VITALS — HEART RATE: 103 BPM | SYSTOLIC BLOOD PRESSURE: 144 MMHG | OXYGEN SATURATION: 95 % | DIASTOLIC BLOOD PRESSURE: 69 MMHG

## 2023-08-31 VITALS — OXYGEN SATURATION: 91 % | HEART RATE: 101 BPM | RESPIRATION RATE: 48 BRPM

## 2023-08-31 VITALS
SYSTOLIC BLOOD PRESSURE: 145 MMHG | HEART RATE: 101 BPM | DIASTOLIC BLOOD PRESSURE: 65 MMHG | RESPIRATION RATE: 25 BRPM | OXYGEN SATURATION: 91 %

## 2023-08-31 VITALS — DIASTOLIC BLOOD PRESSURE: 64 MMHG | HEART RATE: 94 BPM | SYSTOLIC BLOOD PRESSURE: 142 MMHG | OXYGEN SATURATION: 93 %

## 2023-08-31 VITALS
RESPIRATION RATE: 28 BRPM | HEART RATE: 94 BPM | OXYGEN SATURATION: 90 % | TEMPERATURE: 98.5 F | SYSTOLIC BLOOD PRESSURE: 126 MMHG | DIASTOLIC BLOOD PRESSURE: 65 MMHG

## 2023-08-31 VITALS
OXYGEN SATURATION: 94 % | SYSTOLIC BLOOD PRESSURE: 123 MMHG | DIASTOLIC BLOOD PRESSURE: 59 MMHG | RESPIRATION RATE: 21 BRPM | HEART RATE: 89 BPM

## 2023-08-31 VITALS — TEMPERATURE: 97.8 F | OXYGEN SATURATION: 98 % | RESPIRATION RATE: 22 BRPM | HEART RATE: 95 BPM

## 2023-08-31 VITALS — HEART RATE: 84 BPM | OXYGEN SATURATION: 97 %

## 2023-08-31 VITALS
RESPIRATION RATE: 26 BRPM | OXYGEN SATURATION: 89 % | HEART RATE: 97 BPM | SYSTOLIC BLOOD PRESSURE: 129 MMHG | DIASTOLIC BLOOD PRESSURE: 72 MMHG

## 2023-08-31 VITALS — OXYGEN SATURATION: 97 % | HEART RATE: 86 BPM

## 2023-08-31 VITALS — HEART RATE: 102 BPM | OXYGEN SATURATION: 96 %

## 2023-08-31 VITALS — HEART RATE: 95 BPM | OXYGEN SATURATION: 94 %

## 2023-08-31 VITALS
RESPIRATION RATE: 20 BRPM | HEART RATE: 87 BPM | OXYGEN SATURATION: 95 % | DIASTOLIC BLOOD PRESSURE: 64 MMHG | SYSTOLIC BLOOD PRESSURE: 142 MMHG | TEMPERATURE: 98.24 F

## 2023-08-31 VITALS — HEART RATE: 96 BPM | OXYGEN SATURATION: 93 %

## 2023-08-31 VITALS — DIASTOLIC BLOOD PRESSURE: 70 MMHG | SYSTOLIC BLOOD PRESSURE: 147 MMHG | OXYGEN SATURATION: 96 % | HEART RATE: 97 BPM

## 2023-08-31 VITALS — HEART RATE: 91 BPM | OXYGEN SATURATION: 97 %

## 2023-08-31 VITALS — RESPIRATION RATE: 22 BRPM | HEART RATE: 86 BPM | OXYGEN SATURATION: 26 %

## 2023-08-31 VITALS — RESPIRATION RATE: 24 BRPM | OXYGEN SATURATION: 96 % | HEART RATE: 72 BPM

## 2023-08-31 VITALS — HEART RATE: 90 BPM | OXYGEN SATURATION: 96 %

## 2023-08-31 VITALS — HEART RATE: 92 BPM | OXYGEN SATURATION: 98 %

## 2023-08-31 VITALS — OXYGEN SATURATION: 91 % | HEART RATE: 91 BPM

## 2023-08-31 DIAGNOSIS — J44.9: ICD-10-CM

## 2023-08-31 DIAGNOSIS — R06.00: Primary | ICD-10-CM

## 2023-08-31 DIAGNOSIS — F17.210: ICD-10-CM

## 2023-08-31 DIAGNOSIS — J44.1: ICD-10-CM

## 2023-08-31 DIAGNOSIS — U07.1: Primary | ICD-10-CM

## 2023-08-31 DIAGNOSIS — E03.9: ICD-10-CM

## 2023-08-31 DIAGNOSIS — E78.5: ICD-10-CM

## 2023-08-31 DIAGNOSIS — I10: ICD-10-CM

## 2023-08-31 DIAGNOSIS — J96.01: ICD-10-CM

## 2023-08-31 DIAGNOSIS — F41.9: ICD-10-CM

## 2023-08-31 DIAGNOSIS — K21.9: ICD-10-CM

## 2023-08-31 LAB
ADD MANUAL DIFF / SLIDE REVIEW: NO
ALBUMIN SERPL-MCNC: 4.1 G/DL (ref 3.5–5)
ALBUMIN/GLOB SERPL: 1.4 {RATIO} (ref 1–2.8)
ALP SERPL-CCNC: 49 U/L (ref 38–126)
ALT SERPL-CCNC: 18 IU/L (ref ?–35)
BUN SERPL-MCNC: 20 MG/DL (ref 7–17)
CALCIUM SERPL-MCNC: 8.9 MG/DL (ref 8.4–10.2)
CHLORIDE SERPL-SCNC: 104 MMOL/L (ref 98–107)
CK SERPL-CCNC: 55 U/L (ref 30–135)
CO2 SERPL-SCNC: 27 MMOL/L (ref 22–32)
ESTIMATED GLOMERULAR FILT RATE: > 60 ML/MIN (ref 60–?)
GAS PNL BLDA: 66.7 MMHG (ref 35–45)
GLOBULIN SER CALC-MCNC: 3 G/DL (ref 1.7–4.1)
GLUCOSE SERPL-MCNC: 191 MG/DL (ref 80–110)
HCO3 ABG: 29 MMOL/L (ref 23–27)
HEMATOCRIT: 42.7 % (ref 36–46)
HEMOGLOBIN: 14.3 G/DL (ref 12–16)
HEMOLYSIS: 20 (ref 0–50)
LIPASE SERPL-CCNC: 67 U/L (ref 23–300)
LYMPHOCYTES # SPEC AUTO: 1600 /UL (ref 1100–4500)
MCV RBC: 92.1 FL (ref 80–100)
MEAN CORPUSCULAR HEMOGLOBIN: 30.9 PG (ref 26–34)
MEAN CORPUSCULAR HGB CONC: 33.5 % (ref 30–36)
NT-PROBNP SERPL-MCNC: 111 PG/ML (ref ?–125)
PLATELET COUNT: 231 X10^3/UL (ref 150–400)
PO2 BLDA: 46 MMHG (ref 80–100)
POTASSIUM SERPL-SCNC: 3.9 MMOL/L (ref 3.4–5.1)
PROCALCITONIN SERPL-MCNC: 0.04 NG/ML (ref ?–0.5)
PROT SERPL-MCNC: 7.1 G/DL (ref 6.3–8.2)
SAO2 % BLDA: 73 % (ref 95–100)
SODIUM SERPL-SCNC: 139 MMOL/L (ref 137–145)
TCO2 ABG: 31 MMOL/L (ref 23–27)
TROPONIN I SERPL-MCNC: < 0.012 NG/ML (ref 0.01–0.03)

## 2023-08-31 PROCEDURE — 36415 COLL VENOUS BLD VENIPUNCTURE: CPT

## 2023-08-31 PROCEDURE — 84484 ASSAY OF TROPONIN QUANT: CPT

## 2023-08-31 PROCEDURE — 96365 THER/PROPH/DIAG IV INF INIT: CPT

## 2023-08-31 PROCEDURE — 84145 PROCALCITONIN (PCT): CPT

## 2023-08-31 PROCEDURE — 96367 TX/PROPH/DG ADDL SEQ IV INF: CPT

## 2023-08-31 PROCEDURE — 82550 ASSAY OF CK (CPK): CPT

## 2023-08-31 PROCEDURE — 80048 BASIC METABOLIC PNL TOTAL CA: CPT

## 2023-08-31 PROCEDURE — 94762 N-INVAS EAR/PLS OXIMTRY CONT: CPT

## 2023-08-31 PROCEDURE — 83690 ASSAY OF LIPASE: CPT

## 2023-08-31 PROCEDURE — 85025 COMPLETE CBC W/AUTO DIFF WBC: CPT

## 2023-08-31 PROCEDURE — 83880 ASSAY OF NATRIURETIC PEPTIDE: CPT

## 2023-08-31 PROCEDURE — 71045 X-RAY EXAM CHEST 1 VIEW: CPT

## 2023-08-31 PROCEDURE — 36600 WITHDRAWAL OF ARTERIAL BLOOD: CPT

## 2023-08-31 PROCEDURE — 87797 DETECT AGENT NOS DNA DIR: CPT

## 2023-08-31 PROCEDURE — 80053 COMPREHEN METABOLIC PANEL: CPT

## 2023-08-31 PROCEDURE — 94640 AIRWAY INHALATION TREATMENT: CPT

## 2023-08-31 PROCEDURE — 94660 CPAP INITIATION&MGMT: CPT

## 2023-08-31 PROCEDURE — 82805 BLOOD GASES W/O2 SATURATION: CPT

## 2023-08-31 PROCEDURE — 93005 ELECTROCARDIOGRAM TRACING: CPT

## 2023-08-31 PROCEDURE — 99285 EMERGENCY DEPT VISIT HI MDM: CPT

## 2023-08-31 PROCEDURE — 87633 RESP VIRUS 12-25 TARGETS: CPT

## 2023-08-31 RX ADMIN — DEXAMETHASONE SODIUM PHOSPHATE 6 MG: 10 INJECTION INTRAMUSCULAR; INTRAVENOUS at 09:44

## 2023-08-31 RX ADMIN — CEFTRIAXONE SODIUM 200 MG: 2 INJECTION, POWDER, FOR SOLUTION INTRAMUSCULAR; INTRAVENOUS at 04:47

## 2023-08-31 RX ADMIN — REMDESIVIR 250 MG: 5 INJECTION INTRAVENOUS at 06:45

## 2023-08-31 RX ADMIN — IPRATROPIUM BROMIDE AND ALBUTEROL SULFATE 3 ML: .5; 3 SOLUTION RESPIRATORY (INHALATION) at 15:17

## 2023-08-31 RX ADMIN — ATORVASTATIN CALCIUM 10 MG: 20 TABLET, FILM COATED ORAL at 20:08

## 2023-08-31 RX ADMIN — AZITHROMYCIN MONOHYDRATE 250 MG: 500 INJECTION, POWDER, LYOPHILIZED, FOR SOLUTION INTRAVENOUS at 05:22

## 2023-08-31 RX ADMIN — ALBUTEROL SULFATE 10 MG: 2.5 SOLUTION RESPIRATORY (INHALATION) at 03:36

## 2023-08-31 RX ADMIN — ENOXAPARIN SODIUM 40 MG: 40 INJECTION SUBCUTANEOUS at 09:44

## 2023-08-31 RX ADMIN — SODIUM CHLORIDE 1000 ML: 0.9 INJECTION, SOLUTION INTRAVENOUS at 04:46

## 2023-08-31 RX ADMIN — IPRATROPIUM BROMIDE AND ALBUTEROL SULFATE 3 ML: .5; 3 SOLUTION RESPIRATORY (INHALATION) at 19:43

## 2023-09-01 VITALS — OXYGEN SATURATION: 95 % | HEART RATE: 78 BPM

## 2023-09-01 VITALS — OXYGEN SATURATION: 99 % | HEART RATE: 78 BPM

## 2023-09-01 VITALS
DIASTOLIC BLOOD PRESSURE: 65 MMHG | TEMPERATURE: 96.5 F | OXYGEN SATURATION: 97 % | SYSTOLIC BLOOD PRESSURE: 135 MMHG | RESPIRATION RATE: 24 BRPM | HEART RATE: 78 BPM

## 2023-09-01 VITALS — OXYGEN SATURATION: 93 % | HEART RATE: 72 BPM

## 2023-09-01 VITALS
RESPIRATION RATE: 18 BRPM | DIASTOLIC BLOOD PRESSURE: 65 MMHG | SYSTOLIC BLOOD PRESSURE: 135 MMHG | HEART RATE: 77 BPM | OXYGEN SATURATION: 90 %

## 2023-09-01 VITALS
OXYGEN SATURATION: 95 % | HEART RATE: 79 BPM | RESPIRATION RATE: 23 BRPM | SYSTOLIC BLOOD PRESSURE: 140 MMHG | DIASTOLIC BLOOD PRESSURE: 87 MMHG | TEMPERATURE: 97.5 F

## 2023-09-01 VITALS — HEART RATE: 96 BPM | OXYGEN SATURATION: 98 %

## 2023-09-01 VITALS — RESPIRATION RATE: 22 BRPM | OXYGEN SATURATION: 95 % | HEART RATE: 87 BPM

## 2023-09-01 VITALS — HEART RATE: 92 BPM | OXYGEN SATURATION: 100 %

## 2023-09-01 VITALS — HEART RATE: 72 BPM | OXYGEN SATURATION: 98 %

## 2023-09-01 VITALS — HEART RATE: 69 BPM | OXYGEN SATURATION: 97 %

## 2023-09-01 VITALS — OXYGEN SATURATION: 95 % | HEART RATE: 75 BPM

## 2023-09-01 VITALS — OXYGEN SATURATION: 98 % | HEART RATE: 75 BPM

## 2023-09-01 VITALS — OXYGEN SATURATION: 96 % | HEART RATE: 78 BPM

## 2023-09-01 VITALS — HEART RATE: 83 BPM | OXYGEN SATURATION: 99 %

## 2023-09-01 VITALS — OXYGEN SATURATION: 97 % | HEART RATE: 76 BPM

## 2023-09-01 VITALS — HEART RATE: 80 BPM | OXYGEN SATURATION: 90 %

## 2023-09-01 LAB
ADD MANUAL DIFF / SLIDE REVIEW: NO
BUN SERPL-MCNC: 15 MG/DL (ref 7–17)
CALCIUM SERPL-MCNC: 8.7 MG/DL (ref 8.4–10.2)
CHLORIDE SERPL-SCNC: 104 MMOL/L (ref 98–107)
CO2 SERPL-SCNC: 32 MMOL/L (ref 22–32)
ESTIMATED GLOMERULAR FILT RATE: > 60 ML/MIN (ref 60–?)
GLUCOSE SERPL-MCNC: 115 MG/DL (ref 80–110)
HEMATOCRIT: 38.2 % (ref 36–46)
HEMOGLOBIN: 12.9 G/DL (ref 12–16)
HEMOLYSIS: < 15 (ref 0–50)
LYMPHOCYTES # SPEC AUTO: 1300 /UL (ref 1100–4500)
MCV RBC: 91.8 FL (ref 80–100)
MEAN CORPUSCULAR HEMOGLOBIN: 30.9 PG (ref 26–34)
MEAN CORPUSCULAR HGB CONC: 33.7 % (ref 30–36)
PLATELET COUNT: 199 X10^3/UL (ref 150–400)
POTASSIUM SERPL-SCNC: 4 MMOL/L (ref 3.4–5.1)
SODIUM SERPL-SCNC: 138 MMOL/L (ref 137–145)

## 2023-09-01 RX ADMIN — AMLODIPINE BESYLATE 2.5 MG: 5 TABLET ORAL at 08:31

## 2023-09-01 RX ADMIN — IPRATROPIUM BROMIDE AND ALBUTEROL SULFATE 3 ML: .5; 3 SOLUTION RESPIRATORY (INHALATION) at 13:47

## 2023-09-01 RX ADMIN — LOSARTAN POTASSIUM 50 MG: 50 TABLET, FILM COATED ORAL at 08:31

## 2023-09-01 RX ADMIN — DEXAMETHASONE SODIUM PHOSPHATE 6 MG: 10 INJECTION INTRAMUSCULAR; INTRAVENOUS at 08:30

## 2023-09-01 RX ADMIN — AZITHROMYCIN MONOHYDRATE 125 MG: 500 INJECTION, POWDER, LYOPHILIZED, FOR SOLUTION INTRAVENOUS at 05:42

## 2023-09-01 RX ADMIN — LEVOTHYROXINE SODIUM 75 MCG: 50 TABLET ORAL at 05:42

## 2023-09-01 RX ADMIN — REMDESIVIR 250 MG: 5 INJECTION INTRAVENOUS at 09:50

## 2023-09-01 RX ADMIN — PANTOPRAZOLE SODIUM 20 MG: 20 TABLET, DELAYED RELEASE ORAL at 05:42

## 2023-09-01 RX ADMIN — ENOXAPARIN SODIUM 40 MG: 40 INJECTION SUBCUTANEOUS at 08:31

## 2023-12-12 NOTE — PCM.ANEP1
Post Anesthesia Phase 1


PACU Phase 1 Assessment


Vital Signs





 Vital Signs








  Date Time  Temp Pulse Resp B/P Pulse Ox O2 Delivery O2 Flow Rate FiO2


 


4/14/17 07:25 36.1 88 18 162/90 97 Room Air  








Anesthetic Administered:  GA


Level of Alertness:  Sleepy, easy to arouse


MALONE's with Equal Strength:  Yes


Pain:  No


Nausea or Vomiting:  No


Airway Device:  Oralpharangeal Airway


Oxygen Delivery:  Simple Mask


Lungs:  Deidre Barreto DO Apr 14, 2017 09:28 Statement Selected

## 2024-02-14 ENCOUNTER — HOSPITAL ENCOUNTER (OUTPATIENT)
Dept: HOSPITAL 73 - RESP | Age: 74
End: 2024-02-14
Payer: COMMERCIAL

## 2024-02-14 VITALS — BODY MASS INDEX: 23 KG/M2

## 2024-02-14 DIAGNOSIS — F17.210: ICD-10-CM

## 2024-02-14 DIAGNOSIS — J44.9: Primary | ICD-10-CM

## 2024-02-14 PROCEDURE — 94729 DIFFUSING CAPACITY: CPT

## 2024-02-14 PROCEDURE — 94726 PLETHYSMOGRAPHY LUNG VOLUMES: CPT

## 2024-02-14 PROCEDURE — 94060 EVALUATION OF WHEEZING: CPT

## 2024-02-27 ENCOUNTER — HOSPITAL ENCOUNTER (OUTPATIENT)
Age: 74
End: 2024-02-27
Payer: COMMERCIAL

## 2024-02-27 VITALS — BODY MASS INDEX: 23 KG/M2

## 2024-02-27 DIAGNOSIS — R35.0: ICD-10-CM

## 2024-02-27 DIAGNOSIS — R31.0: Primary | ICD-10-CM

## 2024-02-27 DIAGNOSIS — Z72.0: ICD-10-CM

## 2024-02-27 DIAGNOSIS — R39.15: ICD-10-CM

## 2024-02-27 DIAGNOSIS — R30.0: ICD-10-CM

## 2024-02-27 DIAGNOSIS — Z85.51: ICD-10-CM

## 2024-02-27 DIAGNOSIS — Z92.29: ICD-10-CM

## 2024-02-27 DIAGNOSIS — Z87.442: ICD-10-CM

## 2024-02-27 LAB
BUN SERPL-MCNC: 18 MG/DL (ref 7–17)
CALCIUM SERPL-MCNC: 9.1 MG/DL (ref 8.4–10.2)
CHLORIDE SERPL-SCNC: 102 MMOL/L (ref 98–107)
CO2 SERPL-SCNC: 29 MMOL/L (ref 22–32)
ESTIMATED GLOMERULAR FILT RATE: > 60 ML/MIN (ref 60–?)
GLUCOSE SERPL-MCNC: 149 MG/DL (ref 80–110)
HEMOLYSIS: < 15 (ref 0–50)
POTASSIUM SERPL-SCNC: 3.8 MMOL/L (ref 3.4–5.1)
SODIUM SERPL-SCNC: 137 MMOL/L (ref 137–145)

## 2024-02-27 PROCEDURE — 99214 OFFICE O/P EST MOD 30 MIN: CPT

## 2024-02-27 PROCEDURE — 81002 URINALYSIS NONAUTO W/O SCOPE: CPT

## 2024-02-27 PROCEDURE — 80048 BASIC METABOLIC PNL TOTAL CA: CPT

## 2024-02-27 PROCEDURE — 36415 COLL VENOUS BLD VENIPUNCTURE: CPT

## 2024-03-11 ENCOUNTER — HOSPITAL ENCOUNTER (OUTPATIENT)
Dept: HOSPITAL 73 - CT | Age: 74
End: 2024-03-11
Payer: COMMERCIAL

## 2024-03-11 VITALS — BODY MASS INDEX: 23 KG/M2

## 2024-03-11 DIAGNOSIS — R31.0: ICD-10-CM

## 2024-03-11 DIAGNOSIS — J43.9: ICD-10-CM

## 2024-03-11 DIAGNOSIS — K57.90: ICD-10-CM

## 2024-03-11 DIAGNOSIS — N20.0: Primary | ICD-10-CM

## 2024-03-11 DIAGNOSIS — Z72.0: ICD-10-CM

## 2024-03-11 DIAGNOSIS — Z85.51: ICD-10-CM

## 2024-03-11 DIAGNOSIS — K57.10: ICD-10-CM

## 2024-03-11 DIAGNOSIS — N32.9: ICD-10-CM

## 2024-03-11 DIAGNOSIS — D35.02: ICD-10-CM

## 2024-03-11 PROCEDURE — 74178 CT ABD&PLV WO CNTR FLWD CNTR: CPT

## 2024-03-13 ENCOUNTER — HOSPITAL ENCOUNTER (OUTPATIENT)
Age: 74
End: 2024-03-13
Payer: COMMERCIAL

## 2024-03-13 VITALS — BODY MASS INDEX: 23 KG/M2

## 2024-03-13 DIAGNOSIS — Z87.442: ICD-10-CM

## 2024-03-13 DIAGNOSIS — R39.9: ICD-10-CM

## 2024-03-13 DIAGNOSIS — R31.0: ICD-10-CM

## 2024-03-13 DIAGNOSIS — C67.9: Primary | ICD-10-CM

## 2024-03-13 DIAGNOSIS — Z92.29: ICD-10-CM

## 2024-03-13 DIAGNOSIS — Z72.0: ICD-10-CM

## 2024-03-13 PROCEDURE — 52000 CYSTOURETHROSCOPY: CPT

## 2024-03-13 PROCEDURE — 87186 SC STD MICRODIL/AGAR DIL: CPT

## 2024-03-13 PROCEDURE — 87077 CULTURE AEROBIC IDENTIFY: CPT

## 2024-03-13 PROCEDURE — 81002 URINALYSIS NONAUTO W/O SCOPE: CPT

## 2024-03-13 PROCEDURE — 87086 URINE CULTURE/COLONY COUNT: CPT

## 2024-03-13 PROCEDURE — 99214 OFFICE O/P EST MOD 30 MIN: CPT

## 2024-03-26 ENCOUNTER — HOSPITAL ENCOUNTER (OUTPATIENT)
Age: 74
Discharge: HOME | End: 2024-03-26
Payer: COMMERCIAL

## 2024-03-26 VITALS
DIASTOLIC BLOOD PRESSURE: 60 MMHG | OXYGEN SATURATION: 97 % | HEART RATE: 76 BPM | SYSTOLIC BLOOD PRESSURE: 137 MMHG | RESPIRATION RATE: 17 BRPM

## 2024-03-26 VITALS
HEART RATE: 84 BPM | SYSTOLIC BLOOD PRESSURE: 136 MMHG | RESPIRATION RATE: 18 BRPM | DIASTOLIC BLOOD PRESSURE: 58 MMHG | OXYGEN SATURATION: 95 %

## 2024-03-26 VITALS
RESPIRATION RATE: 14 BRPM | DIASTOLIC BLOOD PRESSURE: 49 MMHG | HEART RATE: 72 BPM | SYSTOLIC BLOOD PRESSURE: 104 MMHG | TEMPERATURE: 97.7 F | OXYGEN SATURATION: 97 %

## 2024-03-26 VITALS
HEART RATE: 82 BPM | OXYGEN SATURATION: 92 % | RESPIRATION RATE: 16 BRPM | DIASTOLIC BLOOD PRESSURE: 98 MMHG | SYSTOLIC BLOOD PRESSURE: 150 MMHG

## 2024-03-26 VITALS
HEART RATE: 80 BPM | OXYGEN SATURATION: 92 % | DIASTOLIC BLOOD PRESSURE: 96 MMHG | RESPIRATION RATE: 16 BRPM | SYSTOLIC BLOOD PRESSURE: 144 MMHG

## 2024-03-26 VITALS
HEART RATE: 85 BPM | DIASTOLIC BLOOD PRESSURE: 66 MMHG | SYSTOLIC BLOOD PRESSURE: 118 MMHG | OXYGEN SATURATION: 96 % | RESPIRATION RATE: 22 BRPM

## 2024-03-26 VITALS
RESPIRATION RATE: 22 BRPM | SYSTOLIC BLOOD PRESSURE: 128 MMHG | HEART RATE: 71 BPM | OXYGEN SATURATION: 96 % | DIASTOLIC BLOOD PRESSURE: 58 MMHG

## 2024-03-26 VITALS
DIASTOLIC BLOOD PRESSURE: 88 MMHG | HEART RATE: 82 BPM | SYSTOLIC BLOOD PRESSURE: 152 MMHG | OXYGEN SATURATION: 92 % | RESPIRATION RATE: 16 BRPM

## 2024-03-26 VITALS
HEART RATE: 82 BPM | SYSTOLIC BLOOD PRESSURE: 134 MMHG | DIASTOLIC BLOOD PRESSURE: 76 MMHG | TEMPERATURE: 97.52 F | OXYGEN SATURATION: 92 % | RESPIRATION RATE: 16 BRPM

## 2024-03-26 VITALS
RESPIRATION RATE: 17 BRPM | DIASTOLIC BLOOD PRESSURE: 70 MMHG | OXYGEN SATURATION: 95 % | HEART RATE: 86 BPM | SYSTOLIC BLOOD PRESSURE: 146 MMHG

## 2024-03-26 VITALS
TEMPERATURE: 96.98 F | DIASTOLIC BLOOD PRESSURE: 88 MMHG | SYSTOLIC BLOOD PRESSURE: 134 MMHG | HEART RATE: 80 BPM | RESPIRATION RATE: 16 BRPM | OXYGEN SATURATION: 92 %

## 2024-03-26 VITALS
DIASTOLIC BLOOD PRESSURE: 45 MMHG | RESPIRATION RATE: 16 BRPM | SYSTOLIC BLOOD PRESSURE: 91 MMHG | HEART RATE: 78 BPM | OXYGEN SATURATION: 93 %

## 2024-03-26 VITALS
OXYGEN SATURATION: 100 % | DIASTOLIC BLOOD PRESSURE: 53 MMHG | RESPIRATION RATE: 20 BRPM | HEART RATE: 70 BPM | SYSTOLIC BLOOD PRESSURE: 118 MMHG

## 2024-03-26 VITALS — BODY MASS INDEX: 24.4 KG/M2 | BODY MASS INDEX: 23 KG/M2

## 2024-03-26 VITALS
HEART RATE: 76 BPM | OXYGEN SATURATION: 98 % | SYSTOLIC BLOOD PRESSURE: 112 MMHG | DIASTOLIC BLOOD PRESSURE: 51 MMHG | RESPIRATION RATE: 17 BRPM

## 2024-03-26 DIAGNOSIS — Z85.51: ICD-10-CM

## 2024-03-26 DIAGNOSIS — C67.9: Primary | ICD-10-CM

## 2024-03-26 PROCEDURE — 52240 CYSTOSCOPY AND TREATMENT: CPT

## 2024-03-26 PROCEDURE — 82962 GLUCOSE BLOOD TEST: CPT

## 2024-03-26 PROCEDURE — 0TBB8ZZ EXCISION OF BLADDER, VIA NATURAL OR ARTIFICIAL OPENING ENDOSCOPIC: ICD-10-PCS | Performed by: UROLOGY

## 2024-04-22 ENCOUNTER — HOSPITAL ENCOUNTER (OUTPATIENT)
Age: 74
End: 2024-04-22
Payer: COMMERCIAL

## 2024-04-22 VITALS — BODY MASS INDEX: 23 KG/M2

## 2024-04-22 DIAGNOSIS — R31.0: Primary | ICD-10-CM

## 2024-04-22 PROCEDURE — 87086 URINE CULTURE/COLONY COUNT: CPT
